# Patient Record
Sex: FEMALE | Race: WHITE | NOT HISPANIC OR LATINO | Employment: OTHER | URBAN - METROPOLITAN AREA
[De-identification: names, ages, dates, MRNs, and addresses within clinical notes are randomized per-mention and may not be internally consistent; named-entity substitution may affect disease eponyms.]

---

## 2023-05-08 DIAGNOSIS — E78.5 HYPERLIPIDEMIA, UNSPECIFIED HYPERLIPIDEMIA TYPE: ICD-10-CM

## 2023-05-08 RX ORDER — ATORVASTATIN CALCIUM 20 MG/1
20 TABLET, FILM COATED ORAL DAILY
COMMUNITY
Start: 2017-04-24 | End: 2023-05-08 | Stop reason: SDUPTHER

## 2023-05-08 RX ORDER — ATORVASTATIN CALCIUM 20 MG/1
20 TABLET, FILM COATED ORAL DAILY
Qty: 90 TABLET | Refills: 3 | Status: SHIPPED | OUTPATIENT
Start: 2023-05-08 | End: 2024-04-10 | Stop reason: SDUPTHER

## 2023-07-19 PROBLEM — H93.8X9 BLOCKED EAR: Status: ACTIVE | Noted: 2023-07-19

## 2023-07-19 PROBLEM — K63.5 HYPERPLASTIC COLON POLYP: Status: ACTIVE | Noted: 2023-07-19

## 2023-07-19 PROBLEM — K76.89 LIVER CYST: Status: ACTIVE | Noted: 2023-07-19

## 2023-07-19 PROBLEM — M79.674 CHRONIC PAIN OF TOE OF RIGHT FOOT: Status: ACTIVE | Noted: 2023-07-19

## 2023-07-19 PROBLEM — E04.2 MULTIPLE THYROID NODULES: Status: ACTIVE | Noted: 2023-07-19

## 2023-07-19 PROBLEM — M20.41 ACQUIRED HAMMERTOE OF RIGHT FOOT: Status: ACTIVE | Noted: 2023-07-19

## 2023-07-19 PROBLEM — N89.8 VAGINAL ITCHING: Status: ACTIVE | Noted: 2023-07-19

## 2023-07-19 PROBLEM — I10 BENIGN ESSENTIAL HYPERTENSION: Status: ACTIVE | Noted: 2023-07-19

## 2023-07-19 PROBLEM — M79.676 TOE PAIN: Status: ACTIVE | Noted: 2023-07-19

## 2023-07-19 PROBLEM — L90.0 LICHEN SCLEROSUS: Status: ACTIVE | Noted: 2023-07-19

## 2023-07-19 PROBLEM — R93.1 ELEVATED CORONARY ARTERY CALCIUM SCORE: Status: ACTIVE | Noted: 2023-07-19

## 2023-07-19 PROBLEM — H61.23 IMPACTED CERUMEN OF BOTH EARS: Status: ACTIVE | Noted: 2023-07-19

## 2023-07-19 PROBLEM — R35.0 URINARY FREQUENCY: Status: ACTIVE | Noted: 2023-07-19

## 2023-07-19 PROBLEM — I65.29 MILD ATHEROSCLEROSIS OF CAROTID ARTERY: Status: ACTIVE | Noted: 2023-07-19

## 2023-07-19 PROBLEM — M85.80 OSTEOPENIA: Status: ACTIVE | Noted: 2023-07-19

## 2023-07-19 PROBLEM — G89.29 CHRONIC PAIN OF TOE OF RIGHT FOOT: Status: ACTIVE | Noted: 2023-07-19

## 2023-07-19 PROBLEM — I25.10 CAD (CORONARY ARTERY DISEASE), NATIVE CORONARY ARTERY: Status: ACTIVE | Noted: 2023-07-19

## 2023-07-19 PROBLEM — F41.9 ANXIETY: Status: ACTIVE | Noted: 2023-07-19

## 2023-07-19 PROBLEM — M25.562 LEFT KNEE PAIN: Status: ACTIVE | Noted: 2023-07-19

## 2023-07-19 PROBLEM — M25.512 LEFT SHOULDER PAIN: Status: ACTIVE | Noted: 2023-07-19

## 2023-07-19 PROBLEM — B37.31 VAGINAL CANDIDIASIS: Status: ACTIVE | Noted: 2023-07-19

## 2023-07-19 PROBLEM — K63.5 BENIGN COLONIC POLYP: Status: ACTIVE | Noted: 2023-07-19

## 2023-07-19 PROBLEM — R42 DIZZINESS: Status: ACTIVE | Noted: 2023-07-19

## 2023-07-19 PROBLEM — R07.89 CHEST DISCOMFORT: Status: ACTIVE | Noted: 2023-07-19

## 2023-07-19 PROBLEM — H61.21 IMPACTED CERUMEN OF RIGHT EAR: Status: ACTIVE | Noted: 2023-07-19

## 2023-07-19 PROBLEM — D12.6 TUBULAR ADENOMA OF COLON: Status: ACTIVE | Noted: 2023-07-19

## 2023-07-19 PROBLEM — L84 PRE-ULCERATIVE CORN OR CALLOUS: Status: ACTIVE | Noted: 2023-07-19

## 2023-07-25 LAB
ALANINE AMINOTRANSFERASE (SGPT) (U/L) IN SER/PLAS: 21 U/L (ref 7–45)
ALBUMIN (G/DL) IN SER/PLAS: 4.3 G/DL (ref 3.4–5)
ALKALINE PHOSPHATASE (U/L) IN SER/PLAS: 56 U/L (ref 33–136)
ANION GAP IN SER/PLAS: 10 MMOL/L (ref 10–20)
ASPARTATE AMINOTRANSFERASE (SGOT) (U/L) IN SER/PLAS: 24 U/L (ref 9–39)
BILIRUBIN TOTAL (MG/DL) IN SER/PLAS: 1.4 MG/DL (ref 0–1.2)
CALCIUM (MG/DL) IN SER/PLAS: 9.8 MG/DL (ref 8.6–10.3)
CARBON DIOXIDE, TOTAL (MMOL/L) IN SER/PLAS: 31 MMOL/L (ref 21–32)
CHLORIDE (MMOL/L) IN SER/PLAS: 103 MMOL/L (ref 98–107)
CHOLESTEROL (MG/DL) IN SER/PLAS: 158 MG/DL (ref 0–199)
CHOLESTEROL IN HDL (MG/DL) IN SER/PLAS: 59.6 MG/DL
CHOLESTEROL/HDL RATIO: 2.7
CREATININE (MG/DL) IN SER/PLAS: 0.67 MG/DL (ref 0.5–1.05)
ERYTHROCYTE DISTRIBUTION WIDTH (RATIO) BY AUTOMATED COUNT: 11.6 % (ref 11.5–14.5)
ERYTHROCYTE MEAN CORPUSCULAR HEMOGLOBIN CONCENTRATION (G/DL) BY AUTOMATED: 31.6 G/DL (ref 32–36)
ERYTHROCYTE MEAN CORPUSCULAR VOLUME (FL) BY AUTOMATED COUNT: 98 FL (ref 80–100)
ERYTHROCYTES (10*6/UL) IN BLOOD BY AUTOMATED COUNT: 4.45 X10E12/L (ref 4–5.2)
GFR FEMALE: >90 ML/MIN/1.73M2
GLUCOSE (MG/DL) IN SER/PLAS: 94 MG/DL (ref 74–99)
HEMATOCRIT (%) IN BLOOD BY AUTOMATED COUNT: 43.7 % (ref 36–46)
HEMOGLOBIN (G/DL) IN BLOOD: 13.8 G/DL (ref 12–16)
LDL: 78 MG/DL (ref 0–99)
LEUKOCYTES (10*3/UL) IN BLOOD BY AUTOMATED COUNT: 6.8 X10E9/L (ref 4.4–11.3)
PLATELETS (10*3/UL) IN BLOOD AUTOMATED COUNT: 265 X10E9/L (ref 150–450)
POTASSIUM (MMOL/L) IN SER/PLAS: 4.1 MMOL/L (ref 3.5–5.3)
PROTEIN TOTAL: 6.9 G/DL (ref 6.4–8.2)
SODIUM (MMOL/L) IN SER/PLAS: 140 MMOL/L (ref 136–145)
THYROTROPIN (MIU/L) IN SER/PLAS BY DETECTION LIMIT <= 0.05 MIU/L: 2.08 MIU/L (ref 0.44–3.98)
TRIGLYCERIDE (MG/DL) IN SER/PLAS: 101 MG/DL (ref 0–149)
UREA NITROGEN (MG/DL) IN SER/PLAS: 14 MG/DL (ref 6–23)
VLDL: 20 MG/DL (ref 0–40)

## 2023-07-26 ENCOUNTER — OFFICE VISIT (OUTPATIENT)
Dept: PRIMARY CARE | Facility: CLINIC | Age: 68
End: 2023-07-26
Payer: MEDICARE

## 2023-07-26 VITALS
HEART RATE: 54 BPM | SYSTOLIC BLOOD PRESSURE: 126 MMHG | HEIGHT: 67 IN | DIASTOLIC BLOOD PRESSURE: 83 MMHG | WEIGHT: 155.2 LBS | OXYGEN SATURATION: 98 % | TEMPERATURE: 97.3 F | BODY MASS INDEX: 24.36 KG/M2

## 2023-07-26 DIAGNOSIS — Z00.00 ROUTINE GENERAL MEDICAL EXAMINATION AT HEALTH CARE FACILITY: ICD-10-CM

## 2023-07-26 DIAGNOSIS — M85.80 OSTEOPENIA, UNSPECIFIED LOCATION: ICD-10-CM

## 2023-07-26 DIAGNOSIS — I25.10 CORONARY ARTERY DISEASE INVOLVING NATIVE CORONARY ARTERY OF NATIVE HEART WITHOUT ANGINA PECTORIS: ICD-10-CM

## 2023-07-26 DIAGNOSIS — D12.6 TUBULAR ADENOMA OF COLON: ICD-10-CM

## 2023-07-26 DIAGNOSIS — Z00.00 MEDICARE ANNUAL WELLNESS VISIT, SUBSEQUENT: Primary | ICD-10-CM

## 2023-07-26 DIAGNOSIS — Z12.31 VISIT FOR SCREENING MAMMOGRAM: ICD-10-CM

## 2023-07-26 DIAGNOSIS — K63.5 HYPERPLASTIC COLONIC POLYP, UNSPECIFIED PART OF COLON: ICD-10-CM

## 2023-07-26 DIAGNOSIS — I10 BENIGN ESSENTIAL HYPERTENSION: ICD-10-CM

## 2023-07-26 DIAGNOSIS — I65.29 MILD ATHEROSCLEROSIS OF CAROTID ARTERY, UNSPECIFIED LATERALITY: ICD-10-CM

## 2023-07-26 DIAGNOSIS — E78.5 HYPERLIPIDEMIA, UNSPECIFIED HYPERLIPIDEMIA TYPE: ICD-10-CM

## 2023-07-26 DIAGNOSIS — E04.2 MULTIPLE THYROID NODULES: ICD-10-CM

## 2023-07-26 PROCEDURE — 99214 OFFICE O/P EST MOD 30 MIN: CPT | Performed by: INTERNAL MEDICINE

## 2023-07-26 PROCEDURE — 1170F FXNL STATUS ASSESSED: CPT | Performed by: INTERNAL MEDICINE

## 2023-07-26 PROCEDURE — 1160F RVW MEDS BY RX/DR IN RCRD: CPT | Performed by: INTERNAL MEDICINE

## 2023-07-26 PROCEDURE — G0439 PPPS, SUBSEQ VISIT: HCPCS | Performed by: INTERNAL MEDICINE

## 2023-07-26 PROCEDURE — 3079F DIAST BP 80-89 MM HG: CPT | Performed by: INTERNAL MEDICINE

## 2023-07-26 PROCEDURE — 3074F SYST BP LT 130 MM HG: CPT | Performed by: INTERNAL MEDICINE

## 2023-07-26 PROCEDURE — 93000 ELECTROCARDIOGRAM COMPLETE: CPT | Performed by: INTERNAL MEDICINE

## 2023-07-26 PROCEDURE — 1036F TOBACCO NON-USER: CPT | Performed by: INTERNAL MEDICINE

## 2023-07-26 PROCEDURE — G0444 DEPRESSION SCREEN ANNUAL: HCPCS | Performed by: INTERNAL MEDICINE

## 2023-07-26 PROCEDURE — 1126F AMNT PAIN NOTED NONE PRSNT: CPT | Performed by: INTERNAL MEDICINE

## 2023-07-26 PROCEDURE — G0442 ANNUAL ALCOHOL SCREEN 15 MIN: HCPCS | Performed by: INTERNAL MEDICINE

## 2023-07-26 PROCEDURE — 1159F MED LIST DOCD IN RCRD: CPT | Performed by: INTERNAL MEDICINE

## 2023-07-26 RX ORDER — ACETAMINOPHEN 500 MG
100 TABLET ORAL DAILY
COMMUNITY
Start: 2022-07-25

## 2023-07-26 RX ORDER — METOPROLOL TARTRATE 25 MG/1
25 TABLET, FILM COATED ORAL 2 TIMES DAILY
COMMUNITY
End: 2024-01-10

## 2023-07-26 RX ORDER — BETAMETHASONE DIPROPIONATE 0.5 MG/G
1 OINTMENT, AUGMENTED TOPICAL
COMMUNITY
End: 2023-12-20 | Stop reason: ALTCHOICE

## 2023-07-26 RX ORDER — ASPIRIN 81 MG/1
81 TABLET ORAL DAILY
COMMUNITY
Start: 2022-07-25

## 2023-07-26 ASSESSMENT — ACTIVITIES OF DAILY LIVING (ADL)
BATHING: INDEPENDENT
DOING_HOUSEWORK: INDEPENDENT
TAKING_MEDICATION: INDEPENDENT
GROCERY_SHOPPING: INDEPENDENT
DRESSING: INDEPENDENT
MANAGING_FINANCES: INDEPENDENT

## 2023-07-26 NOTE — PROGRESS NOTES
"Subjective   Patient ID: Elsa Rojo is a 68 y.o. female who presents for Medicare Annual Wellness Visit Subsequent and Follow-up.     Here for MCR and follow up on HTN,HLD,osteopenia,osteopenia.  Exercising regularly,taking meds,no side effect,no complaints.  She has been under stress due to her ' medical bills issues,denies any depression,no alcohol use.           Review of Systems   Constitutional: Negative.    HENT: Negative.     Eyes: Negative.    Respiratory: Negative.     Cardiovascular: Negative.    Gastrointestinal: Negative.    Genitourinary: Negative.    Neurological: Negative.    Hematological: Negative.    Psychiatric/Behavioral: Negative.         Objective   /83 (BP Location: Right arm, Patient Position: Sitting, BP Cuff Size: Large adult)   Pulse 54   Temp 36.3 °C (97.3 °F) (Temporal)   Ht 1.689 m (5' 6.5\")   Wt 70.4 kg (155 lb 3.2 oz)   SpO2 98%   BMI 24.67 kg/m²     Physical Exam  Constitutional:       Appearance: Normal appearance.   HENT:      Head: Normocephalic and atraumatic.      Right Ear: Tympanic membrane, ear canal and external ear normal.      Left Ear: Tympanic membrane, ear canal and external ear normal.      Mouth/Throat:      Mouth: Mucous membranes are moist.   Eyes:      General: No scleral icterus.     Extraocular Movements: Extraocular movements intact.      Pupils: Pupils are equal, round, and reactive to light.   Cardiovascular:      Rate and Rhythm: Normal rate and regular rhythm.      Pulses: Normal pulses.      Heart sounds: Normal heart sounds. No murmur heard.  Pulmonary:      Effort: Pulmonary effort is normal.      Breath sounds: Normal breath sounds. No wheezing or rhonchi.   Abdominal:      General: Abdomen is flat. Bowel sounds are normal. There is no distension.      Palpations: Abdomen is soft. There is no mass.      Tenderness: There is no abdominal tenderness.      Hernia: No hernia is present.   Musculoskeletal:         General: Normal range of " motion.      Cervical back: Normal range of motion and neck supple.      Right lower leg: No edema.      Left lower leg: No edema.   Skin:     General: Skin is warm.      Comments: Healed site of insect bite lower leg,clean scab.   Neurological:      General: No focal deficit present.      Mental Status: She is alert and oriented to person, place, and time.   Psychiatric:         Mood and Affect: Mood normal.         Behavior: Behavior normal.         Assessment/Plan   Problem List Items Addressed This Visit       Hyperlipidemia     Stable on statin,continue low fat diet.         Relevant Orders    ECG 12 lead (Clinic Performed)    Tubular adenoma of colon    Benign essential hypertension     Stable on current med.  Follow up in 6 months.         Relevant Orders    ECG 12 lead (Clinic Performed)    CAD (coronary artery disease), native coronary artery     Elevated cardiac calcium score.  Denies any CP.         Relevant Medications    metoprolol tartrate (Lopressor) 25 mg tablet    Hyperplastic colon polyp     Colonoscopy up to date.         Mild atherosclerosis of carotid artery    Multiple thyroid nodules    Osteopenia    Visit for screening mammogram - Primary    Relevant Orders    BI mammo bilateral screening tomosynthesis     Other Visit Diagnoses       Routine general medical examination at health care facility

## 2023-07-27 PROBLEM — R07.89 CHEST DISCOMFORT: Status: RESOLVED | Noted: 2023-07-19 | Resolved: 2023-07-27

## 2023-07-27 PROBLEM — Z12.31 VISIT FOR SCREENING MAMMOGRAM: Status: ACTIVE | Noted: 2023-07-26

## 2023-07-27 PROBLEM — R42 DIZZINESS: Status: RESOLVED | Noted: 2023-07-19 | Resolved: 2023-07-27

## 2023-07-27 ASSESSMENT — ENCOUNTER SYMPTOMS
CARDIOVASCULAR NEGATIVE: 1
GASTROINTESTINAL NEGATIVE: 1
EYES NEGATIVE: 1
PSYCHIATRIC NEGATIVE: 1
CONSTITUTIONAL NEGATIVE: 1
HEMATOLOGIC/LYMPHATIC NEGATIVE: 1
NEUROLOGICAL NEGATIVE: 1
RESPIRATORY NEGATIVE: 1

## 2023-10-26 ENCOUNTER — OFFICE VISIT (OUTPATIENT)
Dept: PODIATRY | Facility: CLINIC | Age: 68
End: 2023-10-26
Payer: MEDICARE

## 2023-10-26 DIAGNOSIS — G89.29 CHRONIC PAIN OF TOE OF RIGHT FOOT: ICD-10-CM

## 2023-10-26 DIAGNOSIS — M20.41 ACQUIRED HAMMERTOE OF RIGHT FOOT: Primary | ICD-10-CM

## 2023-10-26 DIAGNOSIS — M79.674 CHRONIC PAIN OF TOE OF RIGHT FOOT: ICD-10-CM

## 2023-10-26 PROCEDURE — 1160F RVW MEDS BY RX/DR IN RCRD: CPT | Performed by: PODIATRIST

## 2023-10-26 PROCEDURE — 1036F TOBACCO NON-USER: CPT | Performed by: PODIATRIST

## 2023-10-26 PROCEDURE — 99212 OFFICE O/P EST SF 10 MIN: CPT | Performed by: PODIATRIST

## 2023-10-26 PROCEDURE — 1159F MED LIST DOCD IN RCRD: CPT | Performed by: PODIATRIST

## 2023-10-26 PROCEDURE — 1126F AMNT PAIN NOTED NONE PRSNT: CPT | Performed by: PODIATRIST

## 2023-10-26 NOTE — PROGRESS NOTES
History Of Present Illness  Elsa Rojo is a 68 y.o. female presenting with chief complaint of: toe pain between the 3rd & 4th toe's     Past Medical History  She has a past medical history of Cellulitis, unspecified (12/04/2013), Other conditions influencing health status, Other conditions influencing health status, Other conditions influencing health status, Other conditions influencing health status (10/29/2013), Other microscopic hematuria, Personal history of colonic polyps, and Personal history of other diseases of the digestive system (10/13/2014).    Surgical History  She has a past surgical history that includes Colonoscopy (12/31/2012); Breast lumpectomy (12/31/2012); Other surgical history (12/31/2012); Other surgical history (12/31/2012); and Other surgical history (01/11/2014).     Social History  She reports that she quit smoking about 17 years ago. Her smoking use included cigarettes. She has never used smokeless tobacco. She reports current alcohol use. She reports that she does not currently use drugs.    Family History  No family history on file.     Allergies  Cephalexin, Bacitracin zinc-polymyxin b, Bupropion, Penicillins, Bacitracin, and Neomycin    Medications  Current Outpatient Medications   Medication Sig Dispense Refill    aspirin 81 mg EC tablet Take 1 tablet (81 mg) by mouth once daily.      atorvastatin (Lipitor) 20 mg tablet Take 1 tablet (20 mg) by mouth once daily. 90 tablet 3    betamethasone, augmented, (Diprolene) 0.05 % ointment Apply 1 Application topically. apply topically a pea sized amount to affected area 2 to 3 times weekly      cholecalciferol (Vitamin D-3) 50 mcg (2,000 unit) capsule Take 1 capsule (50 mcg) by mouth once daily.      metoprolol tartrate (Lopressor) 25 mg tablet Take 1 tablet (25 mg) by mouth 2 times a day.       No current facility-administered medications for this visit.       Review of Systems    REVIEW OF SYSTEMS  GENERAL:  Negative for malaise,  significant weight loss, fever  CARDIOVASCULAR: leg swelling   MUSCULOSKELETAL:  Negative for joint pain or swelling, back pain, and muscle pain.  SKIN:  Negative for lesions, rash, and itching  PSYCH:  Negative for sleep disturbance, mood disorder and recent psychosocial stressors  NEURO: Negative, denies any burning, tingling or numbness     Objective:   Vasc: DP and PT pulses are palpable bilateral.  CFT is less than 3 seconds bilateral.  Skin temperature is warm to cool proximal to distal bilateral.      Neuro:  Light touch is intact to the foot bilateral.  Protective sensation is intact to the foot when tested with the 5.07 SWM bilateral.  There is no clonus noted.  The hallux is downgoing bilateral.      Derm: Nails are normal. Skin is supple with normal texture and turgor noted.  Webspaces are clean, dry and intact bilateral.  There are no hyperkeratoses, ulcerations, verruca or other lesions noted.      Ortho: Muscle strength is 5/5 for all pedal groups tested.  Ankle joint, subtalar joint, 1st MPJ and lesser MPJ ROM is full and without pain or crepitus.  The foot type is rectus bilateral off weight bearing.  There are no structural deformities noted. Hammertoe 3,4 without pressure necrosis     Assessment/Plan     Diagnoses and all orders for this visit:  Acquired hammertoe of right foot  Chronic pain of toe of right foot      Since pt is symptom free when she wears toe separator, cont this practice. Avoid surgery.   Neida Martin-Daniel, DPLI  [address] [place] [address]

## 2023-11-09 ENCOUNTER — TELEPHONE (OUTPATIENT)
Dept: PRIMARY CARE | Facility: CLINIC | Age: 68
End: 2023-11-09
Payer: MEDICARE

## 2023-11-09 NOTE — TELEPHONE ENCOUNTER
Pt of Dr Esquivel's. Has been dealing with some personal things with husbands recent cancer diagnosis and her L elbow pain. Requesting an appointment with her only. He is getting ready for a lot of appointments with his treatments currently. Please advise when she may schedule. Thank you!

## 2023-11-09 NOTE — TELEPHONE ENCOUNTER
Pt cannot unfortunately make an appt tomorrow. She is asking for another day/time please. She pointed out that they are getting ready for a lot of things to begin with her husbands cancer treatment so appointments will be picking up soon and she wants to be there for him. Please advise. Thank you!

## 2023-11-15 ENCOUNTER — TELEPHONE (OUTPATIENT)
Dept: OBSTETRICS AND GYNECOLOGY | Facility: CLINIC | Age: 68
End: 2023-11-15
Payer: MEDICARE

## 2023-11-15 NOTE — TELEPHONE ENCOUNTER
Pt requests refills of her betamethasone dipropionate steroid cream be sent to her local pharmacy. Please advise?

## 2023-11-17 ENCOUNTER — APPOINTMENT (OUTPATIENT)
Dept: OBSTETRICS AND GYNECOLOGY | Facility: CLINIC | Age: 68
End: 2023-11-17
Payer: MEDICARE

## 2023-11-17 ENCOUNTER — OFFICE VISIT (OUTPATIENT)
Dept: PRIMARY CARE | Facility: CLINIC | Age: 68
End: 2023-11-17
Payer: MEDICARE

## 2023-11-17 VITALS
TEMPERATURE: 98.1 F | BODY MASS INDEX: 24.71 KG/M2 | SYSTOLIC BLOOD PRESSURE: 125 MMHG | HEIGHT: 67 IN | HEART RATE: 57 BPM | OXYGEN SATURATION: 97 % | DIASTOLIC BLOOD PRESSURE: 77 MMHG | WEIGHT: 157.4 LBS

## 2023-11-17 DIAGNOSIS — F33.9 DEPRESSION, RECURRENT (CMS-HCC): ICD-10-CM

## 2023-11-17 DIAGNOSIS — G47.09 OTHER INSOMNIA: ICD-10-CM

## 2023-11-17 DIAGNOSIS — M25.522 LEFT ELBOW PAIN: Primary | ICD-10-CM

## 2023-11-17 DIAGNOSIS — I10 BENIGN ESSENTIAL HYPERTENSION: ICD-10-CM

## 2023-11-17 DIAGNOSIS — M25.551 ACUTE PAIN OF RIGHT HIP: ICD-10-CM

## 2023-11-17 PROCEDURE — 1160F RVW MEDS BY RX/DR IN RCRD: CPT | Performed by: INTERNAL MEDICINE

## 2023-11-17 PROCEDURE — 1036F TOBACCO NON-USER: CPT | Performed by: INTERNAL MEDICINE

## 2023-11-17 PROCEDURE — 1126F AMNT PAIN NOTED NONE PRSNT: CPT | Performed by: INTERNAL MEDICINE

## 2023-11-17 PROCEDURE — 3078F DIAST BP <80 MM HG: CPT | Performed by: INTERNAL MEDICINE

## 2023-11-17 PROCEDURE — 1159F MED LIST DOCD IN RCRD: CPT | Performed by: INTERNAL MEDICINE

## 2023-11-17 PROCEDURE — 99214 OFFICE O/P EST MOD 30 MIN: CPT | Performed by: INTERNAL MEDICINE

## 2023-11-17 PROCEDURE — 3074F SYST BP LT 130 MM HG: CPT | Performed by: INTERNAL MEDICINE

## 2023-11-17 RX ORDER — TRAZODONE HYDROCHLORIDE 50 MG/1
50 TABLET ORAL NIGHTLY PRN
Qty: 30 TABLET | Refills: 0 | Status: SHIPPED | OUTPATIENT
Start: 2023-11-17 | End: 2023-11-29 | Stop reason: SDUPTHER

## 2023-11-17 ASSESSMENT — PATIENT HEALTH QUESTIONNAIRE - PHQ9
10. IF YOU CHECKED OFF ANY PROBLEMS, HOW DIFFICULT HAVE THESE PROBLEMS MADE IT FOR YOU TO DO YOUR WORK, TAKE CARE OF THINGS AT HOME, OR GET ALONG WITH OTHER PEOPLE: NOT DIFFICULT AT ALL
5. POOR APPETITE OR OVEREATING: NEARLY EVERY DAY
6. FEELING BAD ABOUT YOURSELF - OR THAT YOU ARE A FAILURE OR HAVE LET YOURSELF OR YOUR FAMILY DOWN: NOT AT ALL
2. FEELING DOWN, DEPRESSED OR HOPELESS: SEVERAL DAYS
SUM OF ALL RESPONSES TO PHQ9 QUESTIONS 1 AND 2: 3
1. LITTLE INTEREST OR PLEASURE IN DOING THINGS: MORE THAN HALF THE DAYS
8. MOVING OR SPEAKING SO SLOWLY THAT OTHER PEOPLE COULD HAVE NOTICED. OR THE OPPOSITE, BEING SO FIGETY OR RESTLESS THAT YOU HAVE BEEN MOVING AROUND A LOT MORE THAN USUAL: NOT AT ALL
7. TROUBLE CONCENTRATING ON THINGS, SUCH AS READING THE NEWSPAPER OR WATCHING TELEVISION: SEVERAL DAYS
SUM OF ALL RESPONSES TO PHQ QUESTIONS 1-9: 13
3. TROUBLE FALLING OR STAYING ASLEEP OR SLEEPING TOO MUCH: NEARLY EVERY DAY
9. THOUGHTS THAT YOU WOULD BE BETTER OFF DEAD, OR OF HURTING YOURSELF: NOT AT ALL
4. FEELING TIRED OR HAVING LITTLE ENERGY: NEARLY EVERY DAY

## 2023-11-17 NOTE — PROGRESS NOTES
"Subjective   Patient ID: Elsa Rojo is a 68 y.o. female who presents for Sore left elbow , Sore right hip during walking , and Discuss personal issues .    Pt c/o left elbow pain for a year,triggered by moving her left elbow, she denies any history of trauma or fall.  She also noted recent history of pain right hip with walking at times.  She also want to discuss her insomnia, her  was recently diagnosed with metastatic esophageal cancer, patient has been stressed out about it, she is planning on talking to a counselor, she also has good support from her friend and from her Gnostic[employer] member.  She has been taking 5 mg of melatonin.  She is planning off restarting exercising at the [facility] while her  is seeing his oncologist for his cancer treatment.  For a while she had no motivation but lately decided to change her attitude and be more proactive to regarding her health and regular exercise.         Review of Systems   Musculoskeletal:         As HPI.   Psychiatric/Behavioral:  Positive for sleep disturbance.         As HPI.       Objective   /77 (BP Location: Right arm, Patient Position: Sitting, BP Cuff Size: Adult)   Pulse 57   Temp 36.7 °C (98.1 °F) (Temporal)   Ht 1.689 m (5' 6.5\")   Wt 71.4 kg (157 lb 6.4 oz)   SpO2 97%   BMI 25.02 kg/m²     Physical Exam  Constitutional:       Appearance: Normal appearance.   HENT:      Head: Normocephalic and atraumatic.   Eyes:      Extraocular Movements: Extraocular movements intact.      Pupils: Pupils are equal, round, and reactive to light.   Cardiovascular:      Rate and Rhythm: Normal rate and regular rhythm.      Heart sounds: Normal heart sounds.   Pulmonary:      Effort: Pulmonary effort is normal.      Breath sounds: Normal breath sounds. No wheezing or rhonchi.   Abdominal:      General: Abdomen is flat. Bowel sounds are normal. There is no distension.      Palpations: Abdomen is soft.   Musculoskeletal:      Cervical back: Normal range " of motion and neck supple.      Right lower leg: No edema.      Left lower leg: No edema.      Comments: Left elbow: No effusion no deformities, there is pain over lateral epicondyle area with certain movement of her elbow.  Right hip no palpable pain full range of motion.   Skin:     General: Skin is warm.   Neurological:      General: No focal deficit present.      Mental Status: She is alert and oriented to person, place, and time.   Psychiatric:         Mood and Affect: Mood normal.         Behavior: Behavior normal.         Assessment/Plan   Problem List Items Addressed This Visit             ICD-10-CM    Benign essential hypertension I10     Stable on current medication.         Depression, recurrent (CMS/[facility]) F33.9     Reactive depression due to her  recent diagnosis of cancer.  Emotional support provided to patient.  Patient to see a counselor and she will let me know if she should be started on any medication.  We will start trazodone for insomnia.  She can continue melatonin 5 mg at bedtime as needed.         Left elbow pain - Primary M25.522     Suspect tendinitis.  Will check x-ray and refer to orthopedic.         Relevant Orders    XR elbow left 3+ views    Referral to Orthopaedic Surgery    Other insomnia G47.09    Relevant Medications    traZODone (Desyrel) 50 mg tablet    Acute pain of right hip M25.551     Can take Tylenol as needed.

## 2023-11-18 PROBLEM — M25.551 ACUTE PAIN OF RIGHT HIP: Status: ACTIVE | Noted: 2023-11-18

## 2023-11-18 ASSESSMENT — ENCOUNTER SYMPTOMS: SLEEP DISTURBANCE: 1

## 2023-11-19 NOTE — ASSESSMENT & PLAN NOTE
Reactive depression due to her  recent diagnosis of cancer.  Emotional support provided to patient.  Patient to see a counselor and she will let me know if she should be started on any medication.  We will start trazodone for insomnia.  She can continue melatonin 5 mg at bedtime as needed.

## 2023-11-21 ENCOUNTER — ANCILLARY PROCEDURE (OUTPATIENT)
Dept: RADIOLOGY | Facility: CLINIC | Age: 68
End: 2023-11-21
Payer: MEDICARE

## 2023-11-21 DIAGNOSIS — M25.522 LEFT ELBOW PAIN: ICD-10-CM

## 2023-11-21 PROCEDURE — 73080 X-RAY EXAM OF ELBOW: CPT | Mod: LEFT SIDE | Performed by: RADIOLOGY

## 2023-11-21 PROCEDURE — 73080 X-RAY EXAM OF ELBOW: CPT | Mod: LT

## 2023-11-22 NOTE — RESULT ENCOUNTER NOTE
Left elbow XRAY is normal,no arthritis,there is some  few tiny non specific calcification over the left ulna,please see the orthopedic as advised at recent visit.

## 2023-11-27 NOTE — PROGRESS NOTES
CHIEF COMPLAINT: No chief complaint on file.      History: 68 y.o. female presents to the office today for ***     Past medical history, past surgical history, medications, allergies, family history, social history, and review of systems were reviewed today.    A 12 point review of systems was negative other than as stated in the HPI.    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    Other microscopic hematuria     Microscopic hematuria    Personal history of colonic polyps     History of adenomatous polyp of colon    Personal history of other diseases of the digestive system 10/13/2014    History of hemorrhoids        Allergies   Allergen Reactions    Cephalexin Hives and Rash    Bacitracin Zinc-Polymyxin B Unknown    Bupropion Hives    Penicillins Hives    Bacitracin Rash     Positive patch test    Neomycin Rash     Positive patch test        Past Surgical History:   Procedure Laterality Date    BREAST LUMPECTOMY  12/31/2012    Breast Surgery Lumpectomy    COLONOSCOPY  12/31/2012    Complete Colonoscopy    OTHER SURGICAL HISTORY  12/31/2012    Radiation Therapy    OTHER SURGICAL HISTORY  12/31/2012    Stress Test ECG Performed    OTHER SURGICAL HISTORY  01/11/2014    Pap smear for cervical cancer screening        No family history on file.     Social History     Socioeconomic History    Marital status:      Spouse name: Not on file    Number of children: Not on file    Years of education: Not on file    Highest education level: Not on file   Occupational History    Not on file   Tobacco Use    Smoking status: Former     Types: Cigarettes     Quit date: 2006      "Years since quittin.9    Smokeless tobacco: Never   Vaping Use    Vaping Use: Never used   Substance and Sexual Activity    Alcohol use: Yes     Comment: VERY SOCIAL    Drug use: Not Currently    Sexual activity: Not on file   Other Topics Concern    Not on file   Social History Narrative    Not on file     Social Determinants of Health     Financial Resource Strain: Not on file   Food Insecurity: Not on file   Transportation Needs: Not on file   Physical Activity: Not on file   Stress: Not on file   Social Connections: Not on file   Intimate Partner Violence: Not on file   Housing Stability: Not on file        CURRENT MEDICATIONS:   Current Outpatient Medications   Medication Sig Dispense Refill    aspirin 81 mg EC tablet Take 1 tablet (81 mg) by mouth once daily.      atorvastatin (Lipitor) 20 mg tablet Take 1 tablet (20 mg) by mouth once daily. 90 tablet 3    betamethasone, augmented, (Diprolene) 0.05 % ointment Apply 1 Application topically. apply topically a pea sized amount to affected area 2 to 3 times weekly      cholecalciferol (Vitamin D-3) 50 mcg (2,000 unit) capsule Take 1 capsule (50 mcg) by mouth once daily.      metoprolol tartrate (Lopressor) 25 mg tablet Take 1 tablet (25 mg) by mouth 2 times a day.      traZODone (Desyrel) 50 mg tablet Take 1 tablet (50 mg) by mouth as needed at bedtime for sleep. 30 tablet 0     No current facility-administered medications for this visit.       Physical Examination:      2022     8:07 AM 2022     9:34 AM 2022     2:21 PM 2022     8:52 AM 2023     9:38 AM 2023     8:44 AM 2023     3:43 PM   Vitals   Systolic 129 142 110 124 127 126 125   Diastolic 77 70 66 84 81 83 77   Heart Rate 55 64 60  55 54 57   Temp 36.1 °C (97 °F)    36.7 °C (98.1 °F) 36.3 °C (97.3 °F) 36.7 °C (98.1 °F)   Resp 14         Height (in)  1.689 m (5' 6.5\") 1.689 m (5' 6.5\") 1.689 m (5' 6.5\") 1.689 m (5' 6.5\") 1.689 m (5' 6.5\") 1.689 m (5' 6.5\")   Weight " (lb) 152 152 155 155.38 153.13 155.2 157.4   BMI 24.46 kg/m2 24.17 kg/m2 24.64 kg/m2 24.7 kg/m2 24.34 kg/m2 24.67 kg/m2 25.02 kg/m2   BSA (m2) 1.79 m2 1.8 m2 1.82 m2 1.82 m2 1.81 m2 1.82 m2 1.83 m2   Visit Report      Report Report      There is no height or weight on file to calculate BMI.    Well-appearing, appears stated age, pleasant and cooperative, appropriate mood and behavior. Height and weight reviewed. Alert and oriented x3.  Auditory function intact.  No acute distress.  Intact ocular function, MICHAEL, EOMI. Breathing is unlabored .  There is no evidence of jugular venous distension. Skin appearance is normal without evidence of rash or other lesions. 2+ radial pulses bilaterally, fingers pink and wwp, good capillary refill, no pitting edema. No appreciable lymphadenopathy in bilateral upper extremities. SILT throughout both upper extremities, median/radial/ulnar/musculocutaneous/axillary nerve motor and sensory intact (except for abnormalities noted in focused musculoskeletal exam section below).     Neck exam: ***Full range of motion of the neck in flexion/extension and rotational movements. No significant areas of tenderness to palpation in the neck.    On exam of bilateral upper extremities, ***    Imaging: ***     Assessment: ***    Plan: ***    Dragon software was used to dictate this note, please be aware that minor errors in transcription may be present.    Juan Dominguez MD    Shoulder/Elbow Surgery  [facility]

## 2023-11-28 ENCOUNTER — APPOINTMENT (OUTPATIENT)
Dept: ORTHOPEDIC SURGERY | Facility: CLINIC | Age: 68
End: 2023-11-28
Payer: MEDICARE

## 2023-11-29 DIAGNOSIS — G47.09 OTHER INSOMNIA: ICD-10-CM

## 2023-11-29 RX ORDER — TRAZODONE HYDROCHLORIDE 50 MG/1
50 TABLET ORAL NIGHTLY PRN
Qty: 90 TABLET | Refills: 3 | Status: SHIPPED | OUTPATIENT
Start: 2023-11-29 | End: 2024-02-19 | Stop reason: SDUPTHER

## 2023-11-29 NOTE — TELEPHONE ENCOUNTER
Patient was seen 11/17/2023 and said that you put her on Trazodone to try out and she is doing really well on this medication. Patient is now requesting a 90 day supply sent to [facility]. I sent rx request to you. Please advise once filled.

## 2023-12-11 NOTE — PROGRESS NOTES
CHIEF COMPLAINT: No chief complaint on file.      History: 68 y.o. female presents to the office today for ***     Past medical history, past surgical history, medications, allergies, family history, social history, and review of systems were reviewed today.    A 12 point review of systems was negative other than as stated in the HPI.    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    Other microscopic hematuria     Microscopic hematuria    Personal history of colonic polyps     History of adenomatous polyp of colon    Personal history of other diseases of the digestive system 10/13/2014    History of hemorrhoids        Allergies   Allergen Reactions    Cephalexin Hives and Rash    Bacitracin Zinc-Polymyxin B Unknown    Bupropion Hives    Penicillins Hives    Bacitracin Rash     Positive patch test    Neomycin Rash     Positive patch test        Past Surgical History:   Procedure Laterality Date    BREAST LUMPECTOMY  12/31/2012    Breast Surgery Lumpectomy    COLONOSCOPY  12/31/2012    Complete Colonoscopy    OTHER SURGICAL HISTORY  12/31/2012    Radiation Therapy    OTHER SURGICAL HISTORY  12/31/2012    Stress Test ECG Performed    OTHER SURGICAL HISTORY  01/11/2014    Pap smear for cervical cancer screening        No family history on file.     Social History     Socioeconomic History    Marital status:      Spouse name: Not on file    Number of children: Not on file    Years of education: Not on file    Highest education level: Not on file   Occupational History    Not on file   Tobacco Use    Smoking status: Former     Types: Cigarettes     Quit date: 2006      "Years since quittin.9    Smokeless tobacco: Never   Vaping Use    Vaping Use: Never used   Substance and Sexual Activity    Alcohol use: Yes     Comment: VERY SOCIAL    Drug use: Not Currently    Sexual activity: Not on file   Other Topics Concern    Not on file   Social History Narrative    Not on file     Social Determinants of Health     Financial Resource Strain: Not on file   Food Insecurity: Not on file   Transportation Needs: Not on file   Physical Activity: Not on file   Stress: Not on file   Social Connections: Not on file   Intimate Partner Violence: Not on file   Housing Stability: Not on file        CURRENT MEDICATIONS:   Current Outpatient Medications   Medication Sig Dispense Refill    aspirin 81 mg EC tablet Take 1 tablet (81 mg) by mouth once daily.      atorvastatin (Lipitor) 20 mg tablet Take 1 tablet (20 mg) by mouth once daily. 90 tablet 3    betamethasone, augmented, (Diprolene) 0.05 % ointment Apply 1 Application topically. apply topically a pea sized amount to affected area 2 to 3 times weekly      cholecalciferol (Vitamin D-3) 50 mcg (2,000 unit) capsule Take 1 capsule (50 mcg) by mouth once daily.      metoprolol tartrate (Lopressor) 25 mg tablet Take 1 tablet (25 mg) by mouth 2 times a day.      traZODone (Desyrel) 50 mg tablet Take 1 tablet (50 mg) by mouth as needed at bedtime for sleep. 90 tablet 3     No current facility-administered medications for this visit.       Physical Examination:      2022     8:07 AM 2022     9:34 AM 2022     2:21 PM 2022     8:52 AM 2023     9:38 AM 2023     8:44 AM 2023     3:43 PM   Vitals   Systolic 129 142 110 124 127 126 125   Diastolic 77 70 66 84 81 83 77   Heart Rate 55 64 60  55 54 57   Temp 36.1 °C (97 °F)    36.7 °C (98.1 °F) 36.3 °C (97.3 °F) 36.7 °C (98.1 °F)   Resp 14         Height (in)  1.689 m (5' 6.5\") 1.689 m (5' 6.5\") 1.689 m (5' 6.5\") 1.689 m (5' 6.5\") 1.689 m (5' 6.5\") 1.689 m (5' 6.5\")   Weight " (lb) 152 152 155 155.38 153.13 155.2 157.4   BMI 24.46 kg/m2 24.17 kg/m2 24.64 kg/m2 24.7 kg/m2 24.34 kg/m2 24.67 kg/m2 25.02 kg/m2   BSA (m2) 1.79 m2 1.8 m2 1.82 m2 1.82 m2 1.81 m2 1.82 m2 1.83 m2   Visit Report      Report Report      There is no height or weight on file to calculate BMI.    Well-appearing, appears stated age, pleasant and cooperative, appropriate mood and behavior. Height and weight reviewed. Alert and oriented x3.  Auditory function intact.  No acute distress.  Intact ocular function, MICHAEL, EOMI. Breathing is unlabored .  There is no evidence of jugular venous distension. Skin appearance is normal without evidence of rash or other lesions. 2+ radial pulses bilaterally, fingers pink and wwp, good capillary refill, no pitting edema. No appreciable lymphadenopathy in bilateral upper extremities. SILT throughout both upper extremities, median/radial/ulnar/musculocutaneous/axillary nerve motor and sensory intact (except for abnormalities noted in focused musculoskeletal exam section below).     Neck exam: ***Full range of motion of the neck in flexion/extension and rotational movements. No significant areas of tenderness to palpation in the neck.    On exam of bilateral upper extremities, ***    Imaging: ***     Assessment: ***    Plan: ***    Dragon software was used to dictate this note, please be aware that minor errors in transcription may be present.    Juan Dominguez MD    Shoulder/Elbow Surgery  [facility]

## 2023-12-12 ENCOUNTER — APPOINTMENT (OUTPATIENT)
Dept: ORTHOPEDIC SURGERY | Facility: CLINIC | Age: 68
End: 2023-12-12
Payer: MEDICARE

## 2023-12-14 ENCOUNTER — ANCILLARY PROCEDURE (OUTPATIENT)
Dept: RADIOLOGY | Facility: CLINIC | Age: 68
End: 2023-12-14
Payer: MEDICARE

## 2023-12-14 DIAGNOSIS — Z12.31 ENCOUNTER FOR SCREENING MAMMOGRAM FOR MALIGNANT NEOPLASM OF BREAST: ICD-10-CM

## 2023-12-14 PROCEDURE — 77067 SCR MAMMO BI INCL CAD: CPT

## 2023-12-14 PROCEDURE — 77067 SCR MAMMO BI INCL CAD: CPT | Mod: BILATERAL PROCEDURE | Performed by: RADIOLOGY

## 2023-12-14 PROCEDURE — 77063 BREAST TOMOSYNTHESIS BI: CPT | Mod: BILATERAL PROCEDURE | Performed by: RADIOLOGY

## 2023-12-20 ENCOUNTER — OFFICE VISIT (OUTPATIENT)
Dept: OBSTETRICS AND GYNECOLOGY | Facility: CLINIC | Age: 68
End: 2023-12-20
Payer: MEDICARE

## 2023-12-20 VITALS
WEIGHT: 158 LBS | HEIGHT: 67 IN | BODY MASS INDEX: 24.8 KG/M2 | DIASTOLIC BLOOD PRESSURE: 74 MMHG | SYSTOLIC BLOOD PRESSURE: 118 MMHG

## 2023-12-20 DIAGNOSIS — L90.0 LICHEN SCLEROSUS: ICD-10-CM

## 2023-12-20 DIAGNOSIS — N90.4 LICHEN SCLEROSUS OF VULVA: Primary | ICD-10-CM

## 2023-12-20 PROCEDURE — 1126F AMNT PAIN NOTED NONE PRSNT: CPT | Performed by: OBSTETRICS & GYNECOLOGY

## 2023-12-20 PROCEDURE — 1160F RVW MEDS BY RX/DR IN RCRD: CPT | Performed by: OBSTETRICS & GYNECOLOGY

## 2023-12-20 PROCEDURE — 1036F TOBACCO NON-USER: CPT | Performed by: OBSTETRICS & GYNECOLOGY

## 2023-12-20 PROCEDURE — 3074F SYST BP LT 130 MM HG: CPT | Performed by: OBSTETRICS & GYNECOLOGY

## 2023-12-20 PROCEDURE — 3078F DIAST BP <80 MM HG: CPT | Performed by: OBSTETRICS & GYNECOLOGY

## 2023-12-20 PROCEDURE — 1159F MED LIST DOCD IN RCRD: CPT | Performed by: OBSTETRICS & GYNECOLOGY

## 2023-12-20 PROCEDURE — 99203 OFFICE O/P NEW LOW 30 MIN: CPT | Performed by: OBSTETRICS & GYNECOLOGY

## 2023-12-20 RX ORDER — BETAMETHASONE DIPROPIONATE 0.5 MG/G
CREAM TOPICAL 2 TIMES WEEKLY
Qty: 15 G | Refills: 3 | Status: CANCELLED | OUTPATIENT
Start: 2023-12-21

## 2023-12-20 RX ORDER — BETAMETHASONE DIPROPIONATE 0.5 MG/G
OINTMENT, AUGMENTED TOPICAL 2 TIMES DAILY
Qty: 15 G | Refills: 0 | Status: SHIPPED | OUTPATIENT
Start: 2023-12-20

## 2023-12-20 RX ORDER — BETAMETHASONE DIPROPIONATE 0.5 MG/G
CREAM TOPICAL 2 TIMES WEEKLY
COMMUNITY
End: 2023-12-20 | Stop reason: ALTCHOICE

## 2023-12-20 NOTE — PROGRESS NOTES
Subjective   Patient ID: Elsa Rojo is a 68 y.o. female who presents for Annual Exam (LS check up).  Needs a new gyn.   Lichens, needs to be checked annually.  Needs to be checked.  Lichen three years ago. Flares up with high stress.  Now, it's kind of settled down.   is very sick. Waiting to hear from tumor board if more cancer.   Using betamethasone as needed, and trying not to scratch.   Exercise- yes, gym, walk.         Review of Systems    Objective   Physical Exam  Constitutional:       Appearance: Normal appearance. She is normal weight.   HENT:      Head: Normocephalic.   Neck:      Thyroid: No thyroid mass or thyromegaly.   Pulmonary:      Effort: Pulmonary effort is normal.   Abdominal:      Palpations: Abdomen is soft.   Genitourinary:     General: Normal vulva.      Exam position: Lithotomy position.      Cervix: Normal.      Uterus: Normal.       Adnexa: Right adnexa normal and left adnexa normal.          Comments: Mild erythema  Musculoskeletal:         General: Normal range of motion.      Cervical back: Normal range of motion and neck supple.   Skin:     General: Skin is warm and dry.   Neurological:      General: No focal deficit present.      Mental Status: She is alert.   Psychiatric:         Mood and Affect: Mood normal.         Behavior: Behavior normal.         Thought Content: Thought content normal.         Judgment: Judgment normal.         Assessment/Plan   Problem List Items Addressed This Visit             ICD-10-CM       Genitourinary and Reproductive    Lichen sclerosus of vulva - Primary N90.4     Bethamethasone ointment.  Use weekly for maintenance, then as needed.            Skin    Lichen sclerosus L90.0    Relevant Medications    betamethasone, augmented, (Diprolene, augmented,) 0.05 % ointment            Ruby Alcocer MD 12/20/23 9:58 PM

## 2023-12-21 ENCOUNTER — APPOINTMENT (OUTPATIENT)
Dept: OBSTETRICS AND GYNECOLOGY | Facility: CLINIC | Age: 68
End: 2023-12-21
Payer: MEDICARE

## 2023-12-29 NOTE — PROGRESS NOTES
CHIEF COMPLAINT:   Chief Complaint   Patient presents with    Left Elbow - Pain     History: 68 y.o. female presents to the office today for evaluation of her left elbow.  Patient is right-hand dominant.  She is retired.  She still enjoys walking quite a bit.  She is caring for her  who has a terminal illness.  She says that she is been dealing with pain in her left elbow for about the last year.  No specific injury she can remember.  Pain is primarily laterally based.  Difficulty with certain maneuvers of the left arm especially with full extension and picking up and twisting.  Does feel that she is able to do all of her desired activities, but the pain is still persistent.  Denies any numbness or tingling.  Has not had any specific treatments yet.    Past medical history, past surgical history, medications, allergies, family history, social history, and review of systems were reviewed today.    A 12 point review of systems was negative other than as stated in the HPI.    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    Other microscopic hematuria     Microscopic hematuria    Personal history of colonic polyps     History of adenomatous polyp of colon    Personal history of other diseases of the digestive system 10/13/2014    History of hemorrhoids        Allergies   Allergen Reactions    Cephalexin Hives and Rash    Bacitracin Zinc-Polymyxin B Unknown    Bupropion Hives    Penicillins Hives    Bacitracin Rash     Positive patch test    Neomycin Rash     Positive patch test        Past Surgical History:   Procedure Laterality Date    BREAST LUMPECTOMY Left 01/01/2006     2006 Left breast lumpectomy, XRT    COLONOSCOPY  2012    Complete Colonoscopy    LYMPHADENECTOMY      breast cancer    OTHER SURGICAL HISTORY  2012    Radiation Therapy    OTHER SURGICAL HISTORY  2012    Stress Test ECG Performed    OTHER SURGICAL HISTORY  2014    Pap smear for cervical cancer screening        Family History   Problem Relation Name Age of Onset    Motor neuron disease Mother      Lung cancer Father      Lymphoma Sister          Social History     Socioeconomic History    Marital status:      Spouse name: Not on file    Number of children: Not on file    Years of education: Not on file    Highest education level: Not on file   Occupational History    Occupation: retired   Tobacco Use    Smoking status: Former     Types: Cigarettes     Quit date:      Years since quittin.0    Smokeless tobacco: Never   Vaping Use    Vaping Use: Never used   Substance and Sexual Activity    Alcohol use: Yes     Comment: VERY SOCIAL    Drug use: Not Currently    Sexual activity: Not on file   Other Topics Concern    Not on file   Social History Narrative    Not on file     Social Determinants of Health     Financial Resource Strain: Not on file   Food Insecurity: Not on file   Transportation Needs: Not on file   Physical Activity: Not on file   Stress: Not on file   Social Connections: Not on file   Intimate Partner Violence: Not on file   Housing Stability: Not on file        CURRENT MEDICATIONS:   Current Outpatient Medications   Medication Sig Dispense Refill    aspirin 81 mg EC tablet Take 1 tablet (81 mg) by mouth once daily.      atorvastatin (Lipitor) 20 mg tablet Take 1 tablet (20 mg) by mouth once daily. 90 tablet 3    betamethasone, augmented, (Diprolene, augmented,) 0.05 % ointment Apply topically 2 times a day. 15 g 0    cholecalciferol (Vitamin D-3) 50 mcg (2,000 unit) capsule Take 1 capsule (50 mcg) by mouth once daily.      metoprolol tartrate (Lopressor) 25 mg  "tablet Take 1 tablet (25 mg) by mouth 2 times a day.      traZODone (Desyrel) 50 mg tablet Take 1 tablet (50 mg) by mouth as needed at bedtime for sleep. 90 tablet 3     No current facility-administered medications for this visit.       Physical Examination:      7/25/2022     9:34 AM 8/22/2022     2:21 PM 11/14/2022     8:52 AM 1/11/2023     9:38 AM 7/26/2023     8:44 AM 11/17/2023     3:43 PM 12/20/2023    10:48 AM   Vitals   Systolic 142 110 124 127 126 125 118   Diastolic 70 66 84 81 83 77 74   Heart Rate 64 60  55 54 57    Temp    36.7 °C (98.1 °F) 36.3 °C (97.3 °F) 36.7 °C (98.1 °F)    Height (in) 1.689 m (5' 6.5\") 1.689 m (5' 6.5\") 1.689 m (5' 6.5\") 1.689 m (5' 6.5\") 1.689 m (5' 6.5\") 1.689 m (5' 6.5\") 1.689 m (5' 6.5\")   Weight (lb) 152 155 155.38 153.13 155.2 157.4 158   BMI 24.17 kg/m2 24.64 kg/m2 24.7 kg/m2 24.34 kg/m2 24.67 kg/m2 25.02 kg/m2 25.12 kg/m2   BSA (m2) 1.8 m2 1.82 m2 1.82 m2 1.81 m2 1.82 m2 1.83 m2 1.83 m2   Visit Report     Report Report Report      There is no height or weight on file to calculate BMI.    Well-appearing, appears stated age, pleasant and cooperative, appropriate mood and behavior. Height and weight reviewed. Alert and oriented x3.  Auditory function intact.  No acute distress.  Intact ocular function, MICHAEL, EOMI. Breathing is unlabored .  There is no evidence of jugular venous distension. Skin appearance is normal without evidence of rash or other lesions. 2+ radial pulses bilaterally, fingers pink and wwp, good capillary refill, no pitting edema. No appreciable lymphadenopathy in bilateral upper extremities. SILT throughout both upper extremities, median/radial/ulnar/musculocutaneous/axillary nerve motor and sensory intact (except for abnormalities noted in focused musculoskeletal exam section below).     Neck exam: Full range of motion of the neck in flexion/extension and rotational movements. No significant areas of tenderness to palpation in the neck.    On exam of " bilateral upper extremities, tenderness right over the left lateral epicondyle.  No sensitivity medially or posteriorly.  Negative Tinel's at the cubital tunnel.  Full range of motion and strength of both elbows.  Arc of motion from 0-1 50.  Full pronation supination.  Pain with resisted wrist extension on the left side.  No pain with wrist flexion.    Imaging: Radiographs of left elbow reviewed today.  Personally interpreted by myself.  Preserved joint spaces.  Some calcifications ulnarly.    Assessment: Left lateral epicondylitis    Plan: Long discussion with the patient with treatment options diagnosis.  I do think that she has tennis elbow left elbow.  I did discuss with her that the calcification seen on radiographs do not clinically correlate for where she is having pain and I do not think that these are a big issue.  From my standpoint, we discussed conservative options for tennis elbow.  This included bracing, physician directed exercises, activity modifications, and over-the-counter medications.  I did teach her some activity modifications that she can try on her own.  I did state that we are not as aggressive with cortisone injections in this area anymore, but if her pain persist to the point that it is causing severe limitation in her daily life, then would consider at that point.  All questions were answered.  Follow-up as needed.    Dragon software was used to dictate this note, please be aware that minor errors in transcription may be present.    Juan Dominguez MD    Shoulder/Elbow Surgery  [facility]

## 2024-01-02 ENCOUNTER — OFFICE VISIT (OUTPATIENT)
Dept: ORTHOPEDIC SURGERY | Facility: CLINIC | Age: 69
End: 2024-01-02
Payer: MEDICARE

## 2024-01-02 DIAGNOSIS — M77.12 LATERAL EPICONDYLITIS OF LEFT ELBOW: Primary | ICD-10-CM

## 2024-01-02 DIAGNOSIS — M25.522 LEFT ELBOW PAIN: ICD-10-CM

## 2024-01-02 PROCEDURE — 1036F TOBACCO NON-USER: CPT | Performed by: STUDENT IN AN ORGANIZED HEALTH CARE EDUCATION/TRAINING PROGRAM

## 2024-01-02 PROCEDURE — 99203 OFFICE O/P NEW LOW 30 MIN: CPT | Performed by: STUDENT IN AN ORGANIZED HEALTH CARE EDUCATION/TRAINING PROGRAM

## 2024-01-02 PROCEDURE — 99213 OFFICE O/P EST LOW 20 MIN: CPT | Performed by: STUDENT IN AN ORGANIZED HEALTH CARE EDUCATION/TRAINING PROGRAM

## 2024-01-02 PROCEDURE — 1126F AMNT PAIN NOTED NONE PRSNT: CPT | Performed by: STUDENT IN AN ORGANIZED HEALTH CARE EDUCATION/TRAINING PROGRAM

## 2024-01-02 PROCEDURE — 1159F MED LIST DOCD IN RCRD: CPT | Performed by: STUDENT IN AN ORGANIZED HEALTH CARE EDUCATION/TRAINING PROGRAM

## 2024-01-10 DIAGNOSIS — I10 BENIGN ESSENTIAL HYPERTENSION: Primary | ICD-10-CM

## 2024-01-10 RX ORDER — METOPROLOL TARTRATE 25 MG/1
25 TABLET, FILM COATED ORAL DAILY
Qty: 90 TABLET | Refills: 3 | Status: SHIPPED | OUTPATIENT
Start: 2024-01-10 | End: 2024-05-29

## 2024-01-15 ENCOUNTER — TELEPHONE (OUTPATIENT)
Dept: OBSTETRICS AND GYNECOLOGY | Facility: CLINIC | Age: 69
End: 2024-01-15
Payer: MEDICARE

## 2024-01-15 NOTE — TELEPHONE ENCOUNTER
Pt contacted.   C/o left breast pain past 2 weeks or so.  Remote Hx left breast lumpectomy, s/p radiation 2006.  Had normal mmg last month.   Pt denies feeing any lumps.  Nurse will make dr santiago aware.

## 2024-01-15 NOTE — TELEPHONE ENCOUNTER
Pt contacted.    Pt aware to make appt for clinical breast exam per dr santiago.    Pt transferred to  to schedule.

## 2024-01-18 ENCOUNTER — OFFICE VISIT (OUTPATIENT)
Dept: OBSTETRICS AND GYNECOLOGY | Facility: CLINIC | Age: 69
End: 2024-01-18
Payer: MEDICARE

## 2024-01-18 VITALS
DIASTOLIC BLOOD PRESSURE: 80 MMHG | BODY MASS INDEX: 25.39 KG/M2 | SYSTOLIC BLOOD PRESSURE: 136 MMHG | WEIGHT: 158 LBS | HEIGHT: 66 IN

## 2024-01-18 DIAGNOSIS — N64.4 BREAST PAIN IN FEMALE: Primary | ICD-10-CM

## 2024-01-18 PROCEDURE — 1036F TOBACCO NON-USER: CPT | Performed by: STUDENT IN AN ORGANIZED HEALTH CARE EDUCATION/TRAINING PROGRAM

## 2024-01-18 PROCEDURE — 99213 OFFICE O/P EST LOW 20 MIN: CPT | Performed by: STUDENT IN AN ORGANIZED HEALTH CARE EDUCATION/TRAINING PROGRAM

## 2024-01-18 PROCEDURE — 3079F DIAST BP 80-89 MM HG: CPT | Performed by: STUDENT IN AN ORGANIZED HEALTH CARE EDUCATION/TRAINING PROGRAM

## 2024-01-18 PROCEDURE — 1126F AMNT PAIN NOTED NONE PRSNT: CPT | Performed by: STUDENT IN AN ORGANIZED HEALTH CARE EDUCATION/TRAINING PROGRAM

## 2024-01-18 PROCEDURE — 3075F SYST BP GE 130 - 139MM HG: CPT | Performed by: STUDENT IN AN ORGANIZED HEALTH CARE EDUCATION/TRAINING PROGRAM

## 2024-01-18 PROCEDURE — 1160F RVW MEDS BY RX/DR IN RCRD: CPT | Performed by: STUDENT IN AN ORGANIZED HEALTH CARE EDUCATION/TRAINING PROGRAM

## 2024-01-18 PROCEDURE — 1159F MED LIST DOCD IN RCRD: CPT | Performed by: STUDENT IN AN ORGANIZED HEALTH CARE EDUCATION/TRAINING PROGRAM

## 2024-01-18 NOTE — ASSESSMENT & PLAN NOTE
- Most recent mammogram 12/2023, BIRADS-2  - Current exam consistent with post-radiation changes, no evidence of infection or acute process  - Discussed with patient that given history of radiation, recommend evaluation by breast surgeon for consideration of further work up. Reviewed low suspicion for malignancy given recently normal mammogram; however, defer to expertise of breast surgeon/oncologist for further recommendations  - Referral placed and information for providers at [facility]

## 2024-01-18 NOTE — PROGRESS NOTES
Subjective   Patient ID: Elsa Rojo is a 68 y.o. female who presents for Breast Pain (Left side).    Pt presents today with complaint of left sided breast pain starting in December after her mammogram. History of lumpectomy, lymph node dissection and radiation of left breast for breast cancer in 2006. Reports pain is at site of prior radiation, radiating along chest wall to axilla. Not currently bothersome, but concerned given history of malignancy.    No other symptoms. No fevers, chills, swelling, erythema.    Objective   Physical Exam  Constitutional:       General: She is not in acute distress.     Appearance: Normal appearance.   HENT:      Head: Normocephalic.   Cardiovascular:      Rate and Rhythm: Normal rate.   Pulmonary:      Effort: Pulmonary effort is normal. No respiratory distress.   Chest:   Breasts:     Right: No swelling, mass, nipple discharge, skin change or tenderness.          Comments: No erythema, edema, fluctuance, drainage, tenderness of left breast. No overlying skin changes.  Lymphadenopathy:      Upper Body:      Right upper body: No supraclavicular, axillary or pectoral adenopathy.      Left upper body: No supraclavicular, axillary or pectoral adenopathy.   Skin:     General: Skin is warm and dry.   Neurological:      Mental Status: She is alert and oriented to person, place, and time.   Psychiatric:         Mood and Affect: Mood normal.         Behavior: Behavior normal.         Thought Content: Thought content normal.         Judgment: Judgment normal.         Assessment/Plan   Problem List Items Addressed This Visit             ICD-10-CM    Breast pain in female - Primary N64.4     - Most recent mammogram 12/2023, BIRADS-2  - Current exam consistent with post-radiation changes, no evidence of infection or acute process  - Discussed with patient that given history of radiation, recommend evaluation by breast surgeon for consideration of further work up. Reviewed low suspicion for  malignancy given recently normal mammogram; however, defer to expertise of breast surgeon/oncologist for further recommendations  - Referral placed and information for providers at [facility] given         Relevant Orders    Referral to Breast Surgery            Matt Ma MD 01/18/24 11:06 AM

## 2024-01-26 ENCOUNTER — HOSPITAL ENCOUNTER (OUTPATIENT)
Dept: RADIOLOGY | Facility: CLINIC | Age: 69
Discharge: HOME | End: 2024-01-26
Payer: MEDICARE

## 2024-01-26 ENCOUNTER — OFFICE VISIT (OUTPATIENT)
Dept: PRIMARY CARE | Facility: CLINIC | Age: 69
End: 2024-01-26
Payer: MEDICARE

## 2024-01-26 VITALS
TEMPERATURE: 98 F | HEART RATE: 53 BPM | DIASTOLIC BLOOD PRESSURE: 83 MMHG | SYSTOLIC BLOOD PRESSURE: 134 MMHG | OXYGEN SATURATION: 98 % | BODY MASS INDEX: 25.3 KG/M2 | WEIGHT: 157.4 LBS | HEIGHT: 66 IN

## 2024-01-26 DIAGNOSIS — I10 BENIGN ESSENTIAL HYPERTENSION: Primary | ICD-10-CM

## 2024-01-26 DIAGNOSIS — M25.551 RIGHT HIP PAIN: ICD-10-CM

## 2024-01-26 DIAGNOSIS — F33.9 DEPRESSION, RECURRENT (CMS-HCC): ICD-10-CM

## 2024-01-26 PROBLEM — M20.30 ACQUIRED HALLUX MALLEUS: Status: ACTIVE | Noted: 2023-07-19

## 2024-01-26 PROBLEM — M77.12 LATERAL EPICONDYLITIS OF LEFT ELBOW: Status: ACTIVE | Noted: 2024-01-26

## 2024-01-26 PROBLEM — Z86.0101 HISTORY OF ADENOMATOUS POLYP OF COLON: Status: ACTIVE | Noted: 2024-01-26

## 2024-01-26 PROBLEM — R31.29 MICROSCOPIC HEMATURIA: Status: ACTIVE | Noted: 2023-07-19

## 2024-01-26 PROBLEM — Z86.010 HISTORY OF ADENOMATOUS POLYP OF COLON: Status: ACTIVE | Noted: 2024-01-26

## 2024-01-26 PROBLEM — K57.30 DIVERTICULOSIS OF COLON: Status: ACTIVE | Noted: 2024-01-26

## 2024-01-26 PROCEDURE — 1126F AMNT PAIN NOTED NONE PRSNT: CPT | Performed by: INTERNAL MEDICINE

## 2024-01-26 PROCEDURE — 1036F TOBACCO NON-USER: CPT | Performed by: INTERNAL MEDICINE

## 2024-01-26 PROCEDURE — 3075F SYST BP GE 130 - 139MM HG: CPT | Performed by: INTERNAL MEDICINE

## 2024-01-26 PROCEDURE — 73502 X-RAY EXAM HIP UNI 2-3 VIEWS: CPT | Mod: RT

## 2024-01-26 PROCEDURE — 1160F RVW MEDS BY RX/DR IN RCRD: CPT | Performed by: INTERNAL MEDICINE

## 2024-01-26 PROCEDURE — 3079F DIAST BP 80-89 MM HG: CPT | Performed by: INTERNAL MEDICINE

## 2024-01-26 PROCEDURE — 1159F MED LIST DOCD IN RCRD: CPT | Performed by: INTERNAL MEDICINE

## 2024-01-26 PROCEDURE — 99214 OFFICE O/P EST MOD 30 MIN: CPT | Performed by: INTERNAL MEDICINE

## 2024-01-26 PROCEDURE — 1157F ADVNC CARE PLAN IN RCRD: CPT | Performed by: INTERNAL MEDICINE

## 2024-01-26 PROCEDURE — 73502 X-RAY EXAM HIP UNI 2-3 VIEWS: CPT | Mod: RIGHT SIDE | Performed by: RADIOLOGY

## 2024-01-26 ASSESSMENT — PATIENT HEALTH QUESTIONNAIRE - PHQ9
SUM OF ALL RESPONSES TO PHQ9 QUESTIONS 1 AND 2: 0
1. LITTLE INTEREST OR PLEASURE IN DOING THINGS: NOT AT ALL
2. FEELING DOWN, DEPRESSED OR HOPELESS: NOT AT ALL

## 2024-01-26 NOTE — PROGRESS NOTES
"Subjective   Patient ID: Elsa Rojo is a 68 y.o. female who presents for 6 month follow up.    Pt is here for follow up on HTN and medical condition.  Her left elbow pain improved,was seen by ortho,wore a brace.  She continue to have  right hip with walking at times.  She is also here to follow up on  insomnia, her  was recently diagnosed with metastatic esophageal cancer but now responding to treatment on his recent CT,she has been exercising , she also has good support from her friend and from her [employer] member,she is responding to Trazadone..  She has been taking 5 mg of melatonin.           Review of Systems   Constitutional: Negative.    HENT: Negative.     Eyes: Negative.    Respiratory: Negative.     Cardiovascular: Negative.    Gastrointestinal: Negative.    Genitourinary: Negative.    Musculoskeletal:         As HPI.   Neurological: Negative.    Hematological: Negative.    Psychiatric/Behavioral: Negative.         Objective   /83 (BP Location: Left arm, Patient Position: Sitting, BP Cuff Size: Adult)   Pulse 53   Temp 36.7 °C (98 °F) (Temporal)   Ht 1.676 m (5' 6\")   Wt 71.4 kg (157 lb 6.4 oz)   SpO2 98%   BMI 25.41 kg/m²     Physical Exam  Constitutional:       Appearance: Normal appearance.   HENT:      Head: Normocephalic and atraumatic.   Eyes:      Extraocular Movements: Extraocular movements intact.      Pupils: Pupils are equal, round, and reactive to light.   Cardiovascular:      Rate and Rhythm: Normal rate and regular rhythm.      Heart sounds: Normal heart sounds.   Pulmonary:      Effort: Pulmonary effort is normal.      Breath sounds: Normal breath sounds. No wheezing or rhonchi.   Abdominal:      General: Abdomen is flat. Bowel sounds are normal. There is no distension.      Palpations: Abdomen is soft.   Musculoskeletal:      Cervical back: Normal range of motion and neck supple.      Right lower leg: No edema.      Left lower leg: No edema.      Comments: R hip,fair ROM. "   Skin:     General: Skin is warm.   Neurological:      General: No focal deficit present.      Mental Status: She is alert and oriented to person, place, and time.   Psychiatric:         Mood and Affect: Mood normal.         Behavior: Behavior normal.         Assessment/Plan   Problem List Items Addressed This Visit             ICD-10-CM    Benign essential hypertension - Primary I10    Depression, recurrent (CMS/HCC) F33.9    Right hip pain M25.551    Relevant Orders    XR hip right with pelvis when performed 2 or 3 views (Completed)

## 2024-01-27 ASSESSMENT — ENCOUNTER SYMPTOMS
HEMATOLOGIC/LYMPHATIC NEGATIVE: 1
GASTROINTESTINAL NEGATIVE: 1
NEUROLOGICAL NEGATIVE: 1
CONSTITUTIONAL NEGATIVE: 1
EYES NEGATIVE: 1
PSYCHIATRIC NEGATIVE: 1
RESPIRATORY NEGATIVE: 1
CARDIOVASCULAR NEGATIVE: 1

## 2024-01-28 NOTE — RESULT ENCOUNTER NOTE
Your right hip x-ray showed mild osteoarthritis there is also calcification over the hamstring tendon which can be seen with tendinopathy, please see orthopedic for further evaluation ,call me if you need any referral.

## 2024-02-01 ENCOUNTER — PROCEDURE VISIT (OUTPATIENT)
Dept: PODIATRY | Facility: CLINIC | Age: 69
End: 2024-02-01
Payer: MEDICARE

## 2024-02-01 DIAGNOSIS — M79.674 PAIN IN TOES OF BOTH FEET: Primary | ICD-10-CM

## 2024-02-01 DIAGNOSIS — M79.675 PAIN IN TOES OF BOTH FEET: Primary | ICD-10-CM

## 2024-02-01 DIAGNOSIS — L60.0 INGROWN TOENAIL: ICD-10-CM

## 2024-02-01 DIAGNOSIS — B35.1 ONYCHOMYCOSIS: ICD-10-CM

## 2024-02-01 PROCEDURE — 11721 DEBRIDE NAIL 6 OR MORE: CPT | Performed by: PODIATRIST

## 2024-02-01 NOTE — PROGRESS NOTES
History Of Present Illness  Elsa Rojo is a 68 y.o. female presenting with painful elongated nails.     Past Medical History  She has a past medical history of Cellulitis, unspecified (12/04/2013), Other conditions influencing health status, Other conditions influencing health status, Other conditions influencing health status, Other conditions influencing health status (10/29/2013), Other microscopic hematuria, Personal history of colonic polyps, and Personal history of other diseases of the digestive system (10/13/2014).    Surgical History  She has a past surgical history that includes Colonoscopy (12/31/2012); Breast lumpectomy (Left, 01/01/2006); Other surgical history (12/31/2012); Other surgical history (12/31/2012); Other surgical history (01/11/2014); and Lymphadenectomy.     Social History  She reports that she quit smoking about 18 years ago. Her smoking use included cigarettes. She has never used smokeless tobacco. She reports current alcohol use. She reports that she does not currently use drugs.    Family History  Family History   Problem Relation Name Age of Onset    Motor neuron disease Mother      Lung cancer Father      Lymphoma Sister          Allergies  Cephalexin, Bacitracin zinc-polymyxin b, Bupropion, Penicillins, Bacitracin, and Neomycin    Medications  Current Outpatient Medications   Medication Sig Dispense Refill    aspirin 81 mg EC tablet Take 1 tablet (81 mg) by mouth once daily.      atorvastatin (Lipitor) 20 mg tablet Take 1 tablet (20 mg) by mouth once daily. 90 tablet 3    betamethasone, augmented, (Diprolene, augmented,) 0.05 % ointment Apply topically 2 times a day. 15 g 0    CALCIUM ORAL Take 1 tablet by mouth once daily.      cholecalciferol (Vitamin D-3) 50 mcg (2,000 unit) capsule Take 2 capsules (100 mcg) by mouth once daily.      metoprolol tartrate (Lopressor) 25 mg tablet Take 1 tablet (25 mg) by mouth once daily. 90 tablet 3    traZODone (Desyrel) 50 mg tablet Take 1  tablet (50 mg) by mouth as needed at bedtime for sleep. 90 tablet 3     No current facility-administered medications for this visit.       Review of Systems    REVIEW OF SYSTEMS  GENERAL:  Negative for malaise, significant weight loss, fever  CARDIOVASCULAR: leg swelling   MUSCULOSKELETAL:  Negative for joint pain or swelling, back pain, and muscle pain.  SKIN:  Negative for lesions, rash, and itching  PSYCH:  Negative for sleep disturbance, mood disorder and recent psychosocial stressors  NEURO: Negative, denies any burning, tingling or numbness     Objective:   Vasc: DP and PT pulses are palpable bilateral.  CFT is less than 3 seconds bilateral.  Skin temperature is warm to cool proximal to distal bilateral.      Neuro:  Light touch is intact to the foot bilateral.      Derm: Nails 1-5 bilateral are thickened, elongated and crumbly with subungual debris. Skin is supple with normal texture and turgor noted.  Webspaces are clean, dry and intact bilateral.  There are no hyperkeratoses, ulcerations, verruca or other lesions noted.  Ingrown borders first b/l    Ortho: Muscle strength is 5/5 for all pedal groups tested.  Ankle joint, subtalar joint, 1st MPJ and lesser MPJ ROM is full and without pain or crepitus.  The foot type is rectus bilateral off weight bearing.  There are no structural deformities noted.    Assessment/Plan     Diagnoses and all orders for this visit:  Pain in toes of both feet  Onychomycosis  Ingrown toenail      Toenails are debrided in length and thickness to avoid infection and for pain relief     Neida Martin-Daniel, JONATHAN  [id number] [place] [address]

## 2024-02-09 ENCOUNTER — OFFICE VISIT (OUTPATIENT)
Dept: SURGICAL ONCOLOGY | Facility: HOSPITAL | Age: 69
End: 2024-02-09
Payer: MEDICARE

## 2024-02-09 VITALS
RESPIRATION RATE: 16 BRPM | DIASTOLIC BLOOD PRESSURE: 80 MMHG | SYSTOLIC BLOOD PRESSURE: 120 MMHG | HEIGHT: 67 IN | HEART RATE: 74 BPM | BODY MASS INDEX: 24.64 KG/M2 | WEIGHT: 157 LBS

## 2024-02-09 DIAGNOSIS — N64.4 BREAST PAIN IN FEMALE: Primary | ICD-10-CM

## 2024-02-09 DIAGNOSIS — Z85.3 HISTORY OF INVASIVE BREAST CANCER: ICD-10-CM

## 2024-02-09 PROCEDURE — 1036F TOBACCO NON-USER: CPT | Performed by: NURSE PRACTITIONER

## 2024-02-09 PROCEDURE — 1160F RVW MEDS BY RX/DR IN RCRD: CPT | Performed by: NURSE PRACTITIONER

## 2024-02-09 PROCEDURE — 1126F AMNT PAIN NOTED NONE PRSNT: CPT | Performed by: NURSE PRACTITIONER

## 2024-02-09 PROCEDURE — 1159F MED LIST DOCD IN RCRD: CPT | Performed by: NURSE PRACTITIONER

## 2024-02-09 PROCEDURE — 99213 OFFICE O/P EST LOW 20 MIN: CPT | Performed by: NURSE PRACTITIONER

## 2024-02-09 PROCEDURE — 1157F ADVNC CARE PLAN IN RCRD: CPT | Performed by: NURSE PRACTITIONER

## 2024-02-09 PROCEDURE — 3079F DIAST BP 80-89 MM HG: CPT | Performed by: NURSE PRACTITIONER

## 2024-02-09 PROCEDURE — 3074F SYST BP LT 130 MM HG: CPT | Performed by: NURSE PRACTITIONER

## 2024-02-09 NOTE — PROGRESS NOTES
[facility] [place]  Elsa Rojo female   1955 68 y.o.   [id number]      Chief Complaint  New patient, left breast pain, history left breast cancer.    History Of Present Illness  Elsa Rojo is a very pleasant 68 y.o.  female diagnosed 2006 with left breast invasive ductal carcinoma, ER+/WI+/HER2-. She underwent a left breast partial mastectomy with sentinel lymph node biopsy (0/3). She completed adjuvant radiation therapy and was part of a clinical trial. She started Anastrozole 2006 and was switched to Femara which she completed 2020. She has no family history of breast cancer. She presents today for left axillary/breast pain along with left breast itching. She does not have the pain any more and feels it was probably a muscle strain getting her boots off.     BREAST IMAGIN2024 Bilateral screening mammogram, indicates BI-RADS Category 2.     REPRODUCTIVE HISTORY:  menarche age 10, nulliparous, has one adopted child, OCP's x 2 years, natural menopause age 46, no HRT, scattered fibroglandular tissue    FAMILY CANCER HISTORY:   Maternal Grandmother: Stomach cancer, age 58  Father: Lung cancer, age 58 (cigar smoker)     Review of Systems  Constitutional:  Negative for appetite change, fatigue, fever and unexpected weight change.   HENT:  Negative for ear pain, hearing loss, nosebleeds, sore throat and trouble swallowing.    Eyes:  Negative for discharge, itching and visual disturbance.   Breast: As stated in HPI.  Respiratory:  Negative for cough, chest tightness and shortness of breath.    Cardiovascular:  Negative for chest pain, palpitations and leg swelling.   Gastrointestinal:  Negative for abdominal pain, constipation, diarrhea and nausea.   Endocrine: Negative for cold intolerance and heat intolerance.   Genitourinary:  Negative for dysuria, frequency, hematuria, pelvic pain and vaginal bleeding.   Musculoskeletal:  Negative for arthralgias, back pain, gait problem, joint  swelling and myalgias.   Skin:  Negative for color change and rash.   Allergic/Immunologic: Negative for environmental allergies and food allergies.   Neurological:  Negative for dizziness, tremors, speech difficulty, weakness, numbness and headaches.   Hematological:  Does not bruise/bleed easily.   Psychiatric/Behavioral:  Negative for agitation, dysphoric mood and sleep disturbance. The patient is not nervous/anxious.      Past Medical History  She has a past medical history of Cellulitis, unspecified (12/04/2013), Other conditions influencing health status, Other conditions influencing health status, Other conditions influencing health status, Other conditions influencing health status (10/29/2013), Other microscopic hematuria, Personal history of colonic polyps, and Personal history of other diseases of the digestive system (10/13/2014).    Surgical History  She has a past surgical history that includes Colonoscopy (12/31/2012); Breast lumpectomy (Left, 01/01/2006); Other surgical history (12/31/2012); Other surgical history (12/31/2012); Other surgical history (01/11/2014); and Lymphadenectomy.    Family History  Cancer-related family history includes Lung cancer in her father; Lymphoma in her sister.     Social History  She reports that she quit smoking about 18 years ago. Her smoking use included cigarettes. She has never used smokeless tobacco. She reports current alcohol use. She reports that she does not currently use drugs.    Allergies  Cephalexin, Bacitracin zinc-polymyxin b, Bupropion, Penicillins, Bacitracin, and Neomycin    Medications  Current Outpatient Medications   Medication Instructions    aspirin 81 mg, oral, Daily    atorvastatin (LIPITOR) 20 mg, oral, Daily    betamethasone, augmented, (Diprolene, augmented,) 0.05 % ointment Topical, 2 times daily    CALCIUM ORAL 1 tablet, oral, Daily    cholecalciferol (VITAMIN D-3) 100 mcg, oral, Daily    metoprolol tartrate (LOPRESSOR) 25 mg, oral, Daily     traZODone (DESYREL) 50 mg, oral, Nightly PRN       Last Recorded Vitals  Vitals:    02/09/24 1119   BP: 120/80   Pulse: 74   Resp: 16        Physical Exam  Chest:       Patient is alert and oriented x3 and in a relaxed and appropriate mood. Her gait is steady and hand grasps are equal. Sclera is clear. The breasts are asymmetrical right > left. The left breast has a well healed partial mastectomy incision with tissue divot and axillary incision. The tissue is thickened s/p radiation changes. The breast tissue is soft without palpable abnormalities, discrete nodules or masses. The skin and nipples appear normal. There is no cervical, supraclavicular or axillary lymphadenopathy. Heart rate and rhythm normal, S1 and S2 appreciated. The lungs are clear to auscultation bilaterally. Abdomen is soft and non-tender.     Relevant Results and Imaging  BI mammo bilateral screening tomosynthesis 12/14/2023    Narrative  Interpreted By:  Edwar Covarrubias,  STUDY:  BI MAMMO BILATERAL SCREENING TOMOSYNTHESIS; 12/14/2023 8:27 am    ACCESSION NUMBER(S):  [id number]    ORDERING CLINICIAN:  ROGE POLLOCK    INDICATION:  Screening. History of left lumpectomy and radiation.    COMPARISON:  12/09/2021, 12/12/2022    FINDINGS:  2D and tomosynthesis images were reviewed at 1 mm slice thickness.    Density:  There are areas of scattered fibroglandular tissue.    Postsurgical changes are noted in the left breast as well as  radiation therapy changes. No new suspicious masses or calcifications  are identified.    Impression  No mammographic evidence of malignancy.    BI-RADS CATEGORY:  BI-RADS Category:  2 Benign.  Recommendation:  Routine Screening Mammogram in 1 Year.  Recommended Date:  1 Year.  Laterality:  Bilateral.    For any future breast imaging appointments, please call [phone] ([phone]).      MACRO:  None    Signed by: Edwar Covarrubias 12/19/2023 9:38 AM  Dictation workstation:   [id number]      Time was spent viewing digital  images. I explained the results in depth, along with suggested explanation for follow up recommendations based on the testing results. BI-RADS Category 2    Visit Diagnosis  1. Breast pain in female  Referral to Breast Surgery      2. History of invasive breast cancer          Assessment/Plan  Normal clinical exam and imaging, left breast pain, history of left breast cancer, s/p partial mastectomy, completed radiation therapy, completed just about 5 years of endocrine therapy, no family history of breast cancer, scattered fibroglandular tissue    Plan:  Return to PCP for annual mammograms and exams. Prosthesis bra prescription given.     Patient Discussion/Summary  Your clinical examination and imaging are normal. You no longer need to be seen by a breast specialist for an annual physical breast examination. It is important to continue annual screening mammograms and breast exams through your primary care provider. Please return to see me if you have a new breast problem or abnormal mammogram. It has been a pleasure having you as a patient.     You can see your health information, review clinical summaries from office visits & test results online when you follow your health with MY  Chart, a personal health record. To sign up go to [email or web address]. If you need assistance with signing up or trouble getting into your account call [facility] MD Patient Line 24/7 at [phone].    My office phone number is [phone] if you need to get in touch with me or have additional questions or concerns. Thank you for choosing [facility] and trusting me as your healthcare provider. I look forward to seeing you again at your next office visit. I am honored to be a provider on your health care team and I remain dedicated to helping you achieve your health goals.    Vanna Briceno, TONO-CNP

## 2024-02-19 DIAGNOSIS — G47.09 OTHER INSOMNIA: ICD-10-CM

## 2024-02-19 RX ORDER — TRAZODONE HYDROCHLORIDE 50 MG/1
50 TABLET ORAL NIGHTLY PRN
Qty: 90 TABLET | Refills: 3 | Status: SHIPPED | OUTPATIENT
Start: 2024-02-19 | End: 2025-02-18

## 2024-02-22 NOTE — PROGRESS NOTES
Autumn Mart MD   Adult Reconstruction and Joint Replacement Surgery  Phone: [phone]     Fax: [phone]     INITIAL CONSULTATION      Date of Visit:  3/15/2024    CC: Right hip pain    HPI:  Elsa Rojo is a 68 y.o. female who presents with 6 months of RIGHT hip pain. They were referred by Dr. Esquivel PCP.    Patient has tried the following Ice, NSAIDs, Activity modification, and Xray. Date of last steroid injection: none. Patient does not have pain at night. Patient is able to walk unlimited blocks. Patient is currently using nothing as assistive device. Primarily complains of lateral hip pain and ASIS region. Patient has difficulty with climbing stairs. The pain is significantly impacting her ability to perform activities of daily living. Patient reports no longer able to do activities such as walking up stairs. Still able to attend the gym to walk and bike.     Focused History  PMH: Reviewed, Coronary artery disease, and PE/DVT: none  Breast cancer 2006.   PSH: Reviewed , Hip/Knee replacement: no, Hip/Knee surgery: no, Anesthesia complications: no, Spine surgery: no, Surgical infection: no, and Weight loss surgery: no  Meds: Reviewed, Current Anticoagulants: no, Weight loss medication: no, and Current Opioids: no  Allergies: Reviewed  and The patient reports no contraindications or allergies to cephalosporins, aspirin, NSAIDs or opioids, except as noted above.  FH: No family history of any bleeding or clotting disorders.  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    Other microscopic hematuria     Microscopic hematuria    Personal history of colonic polyps     History of adenomatous polyp of colon    Personal history of other diseases of the digestive system 10/13/2014    History of hemorrhoids       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     Past Surgical History:   Procedure Laterality Date    BREAST LUMPECTOMY Left 01/01/2006 2006 Left breast lumpectomy, XRT    COLONOSCOPY  12/31/2012    Complete Colonoscopy    LYMPHADENECTOMY      breast cancer    OTHER SURGICAL HISTORY  12/31/2012    Radiation Therapy    OTHER SURGICAL HISTORY  12/31/2012    Stress Test ECG Performed    OTHER SURGICAL HISTORY  01/11/2014    Pap smear for cervical cancer screening       Allergies: She is allergic to cephalexin, bacitracin zinc-polymyxin b, bupropion, penicillins, bacitracin, and neomycin.     Medications:  Current Outpatient Medications   Medication Instructions    aspirin 81 mg, oral, Daily    atorvastatin (LIPITOR) 20 mg, oral, Daily    betamethasone, augmented, (Diprolene, augmented,) 0.05 % ointment Topical, 2 times daily    CALCIUM ORAL 1 tablet, oral, Daily    cholecalciferol (VITAMIN D-3) 100 mcg, oral, Daily    metoprolol tartrate (LOPRESSOR) 25  mg, oral, Daily    traZODone (DESYREL) 50 mg, oral, Nightly PRN       Family History   Problem Relation Name Age of Onset    Motor neuron disease Mother      Lung cancer Father      Lymphoma Sister       Documented in chart and reviewed.     Social History     Tobacco Use    Smoking status: Former     Types: Cigarettes     Quit date:      Years since quittin.2    Smokeless tobacco: Never   Substance Use Topics    Alcohol use: Yes     Comment: VERY SOCIAL       Review of Systems: Review of systems completed with medical assistant intake. Please refer to this note.     Falls: The patient denies any recent falls or fall-related injuries.    Physical Exam:  BMI: 25, which is normal.     Constitutional: The patient is well-appearing and well groomed.     Neurological/Psychiatric: The patient is alert and oriented to person, place and time. The patient has a normal mood and affect.    Skin Examination: The skin over the right lower extremity, left lower extremity, right upper extremity, and left upper extremity is intact without any evidence of infection or rash.    Cardiovascular Examination: There are no varicosities and the skin is normal temperature, capillary refill normal, arterial pulses normal, no edema.    Lymphatic Examination: There is no lymphatic swelling or palpable lymph nodes present around the involved joint.    Neurological Examination: Bilateral lower extremities are grossly neurologically intact. Sensation normal, motor function normal.    Gait: The patient ambulates with a coxalgic gait.     Lumbar spine:    No tenderness to palpation midline.    Negative straight leg raise bilaterally.    Right Hip Examination:  Gait: Coxalgic gait.    Examination of the hip reveals the skin to be intact.    There is mild tenderness over the greater trochanter.    There is no obvious swelling.    There is tenderness over the hip flexor complex. There is pain with resisted hip flexion.     Negative lissy test.  "    There is a negative Stinchfield test.    Range of motion is: full extension to 100 degrees of flexion.    The hip internally rotates to 15 degrees and externally rotates to 40 degrees.    Abduction is 40 degrees and adduction is 15 degrees.    There is groin and buttock pain with hip motion.    There is a negative straight leg raise.    Left Hip Examination:  Examination of the hip reveals the skin to be intact.    There is no tenderness over the greater trochanter.    There is no obvious swelling.    There is a negative Stinchfield test.    Range of motion is full extension to 100 degrees of flexion.    The hip internally rotates to 20 and externally rotates 40 degrees.    Abduction is 50 degrees and adduction is 20 degrees.    There is no groin and buttock pain with hip motion.    There is a negative straight leg raise.    Right Knee Examination:  Examination of the right knee reveals the skin to be intact. There is no obvious swelling.    There is no tenderness to palpation.    Range of motion is full extension to 120 degrees of flexion.    The knee is stable.    There is no grinding with range of motion.    There is no patellofemoral crepitus.    Left Knee Examination:  Examination of the left knee reveals the skin to be intact. There is no obvious swelling.    There is no tenderness to palpation.    Range of motion is full extension to 120 degrees of flexion.    The knee is stable.    There is no grinding with range of motion.    There is no patellofemoral crepitus.    Prior Labs:   Lab Results   Component Value Date    WBC 6.8 07/25/2023    HGB 13.8 07/25/2023    HCT 43.7 07/25/2023    MCV 98 07/25/2023     07/25/2023      No results found for: \"INR\", \"PROTIME\"      Lab Results   Component Value Date    GLUCOSE 94 07/25/2023    CALCIUM 9.8 07/25/2023     07/25/2023    K 4.1 07/25/2023    CO2 31 07/25/2023     07/25/2023    BUN 14 07/25/2023    CREATININE 0.67 07/25/2023      No results " "found for: \"CKTOTAL\", \"CKMB\", \"CKMBINDEX\", \"TROPONINI\"   No results found for: \"HGBA1C\"      No results found for: \"CRP\"   No results found for: \"SEDRATE\"     Radiographs:  Radiographs were personally reviewed today with the patient. There is evidence of mild  RIGHT  hip osteoarthritis without bone on bone apposition. There is also mild ossification over the hamstring origin on the ischial tuberosity.     Impression:  68 y.o. year old female presents with mild  RIGHT  hip osteoarthritis without bone on bone apposition. This is now affecting activities of daily living. All appropriate nonsurgical management options have been tried and failed.    Diagnosis:   Hip flexor tightness, right    Trochanteric bursitis of right hip    Recommendations / Plan:    I have discussed the options in detail with the patient. We have discussed anti-inflammatory medication, activity modification, physical therapy, corticosteroid injections, and total hip replacement surgery. She is not a candidate for surgery.     The risks and benefits of all these treatment options have been discussed in detail. Recommend starting physical therapy for troch bursitis and hip flexor tendonitis. A referral was provided. She prefers to hold off from injections. She will take over the counter pain medications as needed. Encouraged them to maintain range of motion and strength around the hip and knee joints.  They will continue to implement these strategies in addressing their pain.       Recommend the patient continue optimizing nonsurgical treatment interventions as outlined above for management of their bursitis.  I would be happy to see them again at any point to discuss progress with this treatment regimen.  The patient verbalizes understanding with the recommendations and treatment plan as outlined above and is in agreement.  Questions were addressed.    _____________  Autumn Mart MD   [facility] "     Approximately 45 minutes were spent on the following tasks:              Preparing for the patient              Reviewing medical records              Taking a patient history              Performing a physical exam              Reviewing treatment options with the patient              Explaining the risks, potential benefits, and alternative to surgery  Explaining the expected rehabilitation after each treatment option  Explaining the potential long term expectations  Evaluating the diagnostic imaging     This office note was transcribed with dictation software.  Please excuse any typographical errors, program misunderstandings leading to inadvertent insertions or deletions of inappropriate wording, pronoun errors and other unintentional transcription errors not noticed on proof-reading.

## 2024-02-23 ENCOUNTER — APPOINTMENT (OUTPATIENT)
Dept: ORTHOPEDIC SURGERY | Facility: CLINIC | Age: 69
End: 2024-02-23
Payer: MEDICARE

## 2024-03-15 ENCOUNTER — OFFICE VISIT (OUTPATIENT)
Dept: ORTHOPEDIC SURGERY | Facility: CLINIC | Age: 69
End: 2024-03-15
Payer: MEDICARE

## 2024-03-15 DIAGNOSIS — M24.551 HIP FLEXOR TIGHTNESS, RIGHT: Primary | ICD-10-CM

## 2024-03-15 DIAGNOSIS — M70.61 TROCHANTERIC BURSITIS OF RIGHT HIP: ICD-10-CM

## 2024-03-15 PROCEDURE — 99214 OFFICE O/P EST MOD 30 MIN: CPT | Performed by: STUDENT IN AN ORGANIZED HEALTH CARE EDUCATION/TRAINING PROGRAM

## 2024-03-15 PROCEDURE — 1157F ADVNC CARE PLAN IN RCRD: CPT | Performed by: STUDENT IN AN ORGANIZED HEALTH CARE EDUCATION/TRAINING PROGRAM

## 2024-03-15 PROCEDURE — 1036F TOBACCO NON-USER: CPT | Performed by: STUDENT IN AN ORGANIZED HEALTH CARE EDUCATION/TRAINING PROGRAM

## 2024-03-15 PROCEDURE — 1159F MED LIST DOCD IN RCRD: CPT | Performed by: STUDENT IN AN ORGANIZED HEALTH CARE EDUCATION/TRAINING PROGRAM

## 2024-03-15 PROCEDURE — 1160F RVW MEDS BY RX/DR IN RCRD: CPT | Performed by: STUDENT IN AN ORGANIZED HEALTH CARE EDUCATION/TRAINING PROGRAM

## 2024-03-26 ENCOUNTER — EVALUATION (OUTPATIENT)
Dept: PHYSICAL THERAPY | Facility: CLINIC | Age: 69
End: 2024-03-26
Payer: MEDICARE

## 2024-03-26 DIAGNOSIS — M25.551 RIGHT HIP PAIN: Primary | ICD-10-CM

## 2024-03-26 DIAGNOSIS — M24.551 HIP FLEXOR TIGHTNESS, RIGHT: ICD-10-CM

## 2024-03-26 PROCEDURE — 97110 THERAPEUTIC EXERCISES: CPT | Mod: GP

## 2024-03-26 PROCEDURE — 97140 MANUAL THERAPY 1/> REGIONS: CPT | Mod: GP

## 2024-03-26 PROCEDURE — 97161 PT EVAL LOW COMPLEX 20 MIN: CPT | Mod: GP

## 2024-03-26 NOTE — PROGRESS NOTES
"Physical Therapy     Physical Therapy Evaluation        Patient Name: Elsa Rojo  MRN: [id number]  Today's Date: 3/26/2024  Time Calculation  Start Time: 0949  Stop Time: 1048  Time Calculation (min): 59 min    Reason for Visit  Referred by: Dr. Childress  Referral DX: right hip flexor tightness    Insurance:  Visit: #1  Authorized: [place]  Certification: 3-26-24 to 5-10-24  Payor: MEDICARE / Plan: MEDICARE PART A AND B / Product Type: *No Product type* /     Diagnosis:  1. Right hip pain    2. Hip flexor tightness, right        Subjective:  Date of Onset: 9-1-23  Chief Complaint: right hip pain  DEVON: unknown cause, gradual onset  HX: developed AM stiffness and pain around Sep '23; \"I have trouble getting up but after I move some it does improve. Also, I have pain when getting up from a chair or when I have to climb stairs.\" ; referred to therapy by PCP, x-ray: \"mild hip OA\"     Prior level of function: no prior limitation   Functional limitations: stair negotiation, tx out of chair, limps when walking distances  Home environment: stairs   Work status/occupation: retired  Recreational activity: walking, gym, biking     Patient stated goals for treatment: reduce pain     Reviewed medical screening history form with patient: completed 3-26-24      Precautions: no fall risk         Pain:  Location: right anterolateral hip   Nature: ache  Current pain: 0/10; Range: 0-7/10  Aggravating factor: ascending stairs, walking, getting out of a chair, movement after prolonged sitting  Alleviating factor: rest     Objective:  Observation/Posture: decreased right weight shift    AROM: Right hip: flex 110, IR 16, ER 28   Left hip: flex 122, IR 30, ER 30    PROM/mobility: decreased right hip mobility     MMT: hip ABD: R 3/5, L 3+/5    Flexibility: SLR BLE 70, Anthony (+) BLE    Balance: Reduced SLS to less than 5 seconds on either LE; able to hold longer with single UE support    Gait: Decreased stance RLE      Outcome Measure:   Other " Measures  Lower Extremity Funtional Score (LEFS): 29       Treatment:  Educated pt on course of therapy  Sup right hip distraction Gr III x 5 min  Sup pelvic tilt x 10  Sup hook lying bent knee fall out x 10 sec x 10  Sup hook lying hip ABD-ER isometric x 10  Sup hip flexor stretch x 60 sec x 3    OP Education: HEP: #3-6    Charges: Eval, TE x 1, Manual x 1    Assessment:   Pt presents with dx right hip tightness/pain. Demonstrates limited right hip motion and mobility with gluteal weakness. Pt will benefit from therapy to address deficits.     Plan:  Frequency/duration: 1x/wk for 6 weeks  Planned interventions: Therapeutic exercise (ROM, stretching, strengthening), manual therapy, balance/gait training, education and activity modification  Rehab Potential to achieve goals: good     Goals:  Improve LEFS > 9 by 6 weeks  Improve AROM right hip flexion to 120 degrees to allow pt to tx from low seat by 6 weeks  Improve MMT bilateral hip ABD to 4/5 to allow reciprocal stair negotiation over 10 stairs by 6 weeks  Resume recreational exercise without restriction by 6 weeks    Jose Juan Rosen, PT

## 2024-04-10 ENCOUNTER — TREATMENT (OUTPATIENT)
Dept: PHYSICAL THERAPY | Facility: CLINIC | Age: 69
End: 2024-04-10
Payer: MEDICARE

## 2024-04-10 DIAGNOSIS — E78.5 HYPERLIPIDEMIA, UNSPECIFIED HYPERLIPIDEMIA TYPE: ICD-10-CM

## 2024-04-10 DIAGNOSIS — M25.551 RIGHT HIP PAIN: Primary | ICD-10-CM

## 2024-04-10 PROCEDURE — 97140 MANUAL THERAPY 1/> REGIONS: CPT | Mod: GP

## 2024-04-10 PROCEDURE — 97110 THERAPEUTIC EXERCISES: CPT | Mod: GP

## 2024-04-10 RX ORDER — ATORVASTATIN CALCIUM 20 MG/1
20 TABLET, FILM COATED ORAL DAILY
Qty: 90 TABLET | Refills: 0 | Status: SHIPPED | OUTPATIENT
Start: 2024-04-10

## 2024-04-10 NOTE — PROGRESS NOTES
"Physical Therapy    Physical Therapy Treatment    Patient Name: Elsa Rojo  MRN: [id number]  Today's Date: 4/10/2024  Time Calculation  Start Time: 0801  Stop Time: 0848  Time Calculation (min): 47 min        Insurance:  Visit: #2  [place]  Certification: 3-26-24 to 5-10-24  Payor: MEDICARE / Plan: MEDICARE PART A AND B / Product Type: *No Product type* /     Subjective:  \"I've notice less pain when doing the stairs.\"    Current pain: 0/10; Range: 0-3/10    Objective:  AROM: right hip: flex: 115    Treatment:  Sup right hip distraction Gr III x 30 sec x 5  Sup pelvic tilt x 10   Sup hook lying bent knee fall out x 10 sec x 10  Sup hip flexor stretch x 60 sec x 3  Sup hook lying hip ABD-ER x isometric x 10  Sup bridge x 10  S/L hip ABD-ER x 10     OP Education: HEP: #6    Charges: Manual x 1, TE x 2        Diagnosis:  1. Right hip pain        Assessment:   Pt demonstrates good progress with increased right hip flexion ROM and significant reduction in pain reports.     Plan:  Continue with focus on improving right hip mobility and ROM.     Jose Juan Rosen, PT  "

## 2024-04-17 ENCOUNTER — TREATMENT (OUTPATIENT)
Dept: PHYSICAL THERAPY | Facility: CLINIC | Age: 69
End: 2024-04-17
Payer: MEDICARE

## 2024-04-17 DIAGNOSIS — M25.551 RIGHT HIP PAIN: ICD-10-CM

## 2024-04-17 DIAGNOSIS — M24.551 HIP FLEXOR TIGHTNESS, RIGHT: ICD-10-CM

## 2024-04-17 PROCEDURE — 97140 MANUAL THERAPY 1/> REGIONS: CPT | Mod: CQ,GP

## 2024-04-17 PROCEDURE — 97110 THERAPEUTIC EXERCISES: CPT | Mod: GP,CQ

## 2024-04-17 ASSESSMENT — PAIN SCALES - GENERAL: PAINLEVEL_OUTOF10: 2

## 2024-04-17 ASSESSMENT — PAIN - FUNCTIONAL ASSESSMENT: PAIN_FUNCTIONAL_ASSESSMENT: 0-10

## 2024-04-17 NOTE — PROGRESS NOTES
"      Physical Therapy Treatment    Patient Name: Elsa Rojo  MRN: [id number]  Today's Date: 4/17/2024  Time Calculation  Start Time: 0910  Stop Time: 0956  Time Calculation (min): 46 min  PT Therapeutic Procedures Time Entry  Manual Therapy Time Entry: 15  Therapeutic Exercise Time Entry: 31     Insurance   Visit: #3  [place]ized[place]  Certification: 3-26-24 to 5-10-24  Payor: MEDICARE / Plan: MEDICARE PART A AND B / Product Type: *No Product type* /     Current Problem:  1. Right hip pain        Subjective:  I feel so much better after the last person stretched me out. I noticed that I drive with my right side and when I got out of my car to walk up here I noticed it was aching.    Pain:  Pain Assessment: 0-10  Pain Score: 2    Objective:  AROM: right hip: flex: 110     Precautions  Precautions  Precautions Comment: none    Treatments:  Therapeutic Exercise 08085: Unit(s)- 2  Sup hip flexor stretch x 60 sec x 1 R/L  Sup pelvic tilt x 10   Supine march w/ TA- 1x15 R/L  Sup HL BKFO x 15 R/L  Sup hook lying hip ABD-ER x isometric w/ green mini band x 10 x 5\" hold  Supine SLR w/ pelvic tilt- 1x5x5\" hold R/L  Sup bridge x 10 x 5 \" hold  S/L hip ABD-ER x 15 R/L    Manual Therapy 47849: Unit(s)- 1  Supine GR III Mob Caudal glide  with strap in hip flexion 2x10  Supine GR III Mob Cuadual glide with strap  in hip flexion/er 2x10  Supine GR III Lateral glide with strap in flexion 2x10  Sup right hip long axis distraction Gr III x 30 sec x 5    Assessment:  Pt demonstrates good progress with increased right hip flexion ROM and significant reduction in pain reports. Pt. States relief from manual and was able to tolerate all exercises this date w/o increase in sx.    Plan:  Continue with focus on improving right hip mobility and ROM.     The following treatment was performed by KM Roy under direction supervision of DOREEN.  "

## 2024-04-24 ENCOUNTER — APPOINTMENT (OUTPATIENT)
Dept: PHYSICAL THERAPY | Facility: CLINIC | Age: 69
End: 2024-04-24
Payer: MEDICARE

## 2024-04-25 ENCOUNTER — TELEPHONE (OUTPATIENT)
Dept: PRIMARY CARE | Facility: CLINIC | Age: 69
End: 2024-04-25

## 2024-04-25 ENCOUNTER — TELEMEDICINE (OUTPATIENT)
Dept: PRIMARY CARE | Facility: CLINIC | Age: 69
End: 2024-04-25
Payer: MEDICARE

## 2024-04-25 DIAGNOSIS — J06.9 URTI (ACUTE UPPER RESPIRATORY INFECTION): Primary | ICD-10-CM

## 2024-04-25 PROCEDURE — 1157F ADVNC CARE PLAN IN RCRD: CPT | Performed by: INTERNAL MEDICINE

## 2024-04-25 PROCEDURE — 1160F RVW MEDS BY RX/DR IN RCRD: CPT | Performed by: INTERNAL MEDICINE

## 2024-04-25 PROCEDURE — 1159F MED LIST DOCD IN RCRD: CPT | Performed by: INTERNAL MEDICINE

## 2024-04-25 PROCEDURE — 99214 OFFICE O/P EST MOD 30 MIN: CPT | Performed by: INTERNAL MEDICINE

## 2024-04-25 RX ORDER — BENZONATATE 100 MG/1
100 CAPSULE ORAL 3 TIMES DAILY PRN
Qty: 42 CAPSULE | Refills: 0 | Status: SHIPPED | OUTPATIENT
Start: 2024-04-25 | End: 2024-05-25

## 2024-04-25 RX ORDER — AZITHROMYCIN 250 MG/1
TABLET, FILM COATED ORAL DAILY
Qty: 6 TABLET | Refills: 0 | Status: SHIPPED | OUTPATIENT
Start: 2024-04-25 | End: 2024-04-30

## 2024-04-25 NOTE — TELEPHONE ENCOUNTER
Patient states she had a VV appt with you this morning - states she did a Covid test and it was positive.   States her 's test was negative

## 2024-04-25 NOTE — PROGRESS NOTES
Subjective   Patient ID: [id number]     Elsa Rojo is a 68 y.o. female who presents for URI (Sx started this past Monday with headache, fatigue, cough, congestion. Denies any sinus pressure, drainage, ear pain, sore throat, fever, SOB, or wheezing. Pt took a at home covid test that showed up positive but pt states that it was . ).    Current Outpatient Medications:     aspirin 81 mg EC tablet, Take 1 tablet (81 mg) by mouth once daily., Disp: , Rfl:     atorvastatin (Lipitor) 20 mg tablet, Take 1 tablet (20 mg) by mouth once daily., Disp: 90 tablet, Rfl: 0    betamethasone, augmented, (Diprolene, augmented,) 0.05 % ointment, Apply topically 2 times a day., Disp: 15 g, Rfl: 0    CALCIUM ORAL, Take 1 tablet by mouth once daily., Disp: , Rfl:     cholecalciferol (Vitamin D-3) 50 mcg (2,000 unit) capsule, Take 2 capsules (100 mcg) by mouth once daily., Disp: , Rfl:     metoprolol tartrate (Lopressor) 25 mg tablet, Take 1 tablet (25 mg) by mouth once daily., Disp: 90 tablet, Rfl: 3    traZODone (Desyrel) 50 mg tablet, Take 1 tablet (50 mg) by mouth as needed at bedtime for sleep., Disp: 90 tablet, Rfl: 3  HPI  Virtual or Telephone Consent    An interactive audio and video telecommunication system which permits real time communications between the patient (at the originating site) and provider (at the distant site) was utilized to provide this telehealth service.   Verbal consent was requested and obtained from Elsa Rojo on this date, 24 for a telehealth visit.      68-year-old patient participated in this virtual visit to discuss respiratory symptoms.  Patient stated that on  she did not feel good she felt tired and low energy and she had a headache and on Monday she started experiencing cough that is slightly productive.  She does not have sore throat but she does have excessive postnasal drainage and congestion and headaches.  Her  is immunocompromised she took an  COVID test which she  is not 100% certain that it was positive she is going to repeat the test.  No fever chills or rigors no shortness of breath or wheezing no loss of taste or loss of smell.  Review of system was reviewed all normal except what is noted in HPI   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    Other microscopic hematuria     Microscopic hematuria    Personal history of colonic polyps     History of adenomatous polyp of colon    Personal history of other diseases of the digestive system 10/13/2014    History of hemorrhoids      Objective   There were no vitals taken for this visit.     Physical Exam    Assessment/Plan   Problem List Items Addressed This Visit    None  68-year-old patient with the following issues.    1.  Symptoms suggestive of upper respiratory tract infection, at this point we are going to treat the patient with a course of antibiotic a prescription for Zithromax was sent to the pharmacy along with benzonatate for cough control patient was encouraged to increase her fluid intake and to take 1 g of vitamin C daily.  She is going to test for COVID but even if she test positive because her symptoms are mild treatment will be supportive patient was instructed to contact us if her symptoms worsen or do not improve all her questions were addressed no other active issues or concerns.    Liv Marie MD

## 2024-05-03 ENCOUNTER — APPOINTMENT (OUTPATIENT)
Dept: PHYSICAL THERAPY | Facility: CLINIC | Age: 69
End: 2024-05-03
Payer: MEDICARE

## 2024-05-29 DIAGNOSIS — I10 BENIGN ESSENTIAL HYPERTENSION: ICD-10-CM

## 2024-05-29 RX ORDER — METOPROLOL TARTRATE 25 MG/1
25 TABLET, FILM COATED ORAL DAILY
Qty: 90 TABLET | Refills: 0 | Status: SHIPPED | OUTPATIENT
Start: 2024-05-29 | End: 2024-06-04 | Stop reason: SDUPTHER

## 2024-05-29 RX ORDER — METOPROLOL TARTRATE 25 MG/1
25 TABLET, FILM COATED ORAL DAILY
Qty: 180 TABLET | Refills: 0 | OUTPATIENT
Start: 2024-05-29

## 2024-06-04 ENCOUNTER — TREATMENT (OUTPATIENT)
Dept: PHYSICAL THERAPY | Facility: CLINIC | Age: 69
End: 2024-06-04
Payer: MEDICARE

## 2024-06-04 DIAGNOSIS — M24.551 HIP FLEXOR TIGHTNESS, RIGHT: ICD-10-CM

## 2024-06-04 DIAGNOSIS — I10 BENIGN ESSENTIAL HYPERTENSION: ICD-10-CM

## 2024-06-04 DIAGNOSIS — M25.551 RIGHT HIP PAIN: Primary | ICD-10-CM

## 2024-06-04 PROCEDURE — 97110 THERAPEUTIC EXERCISES: CPT | Mod: GP

## 2024-06-04 RX ORDER — METOPROLOL TARTRATE 25 MG/1
25 TABLET, FILM COATED ORAL 2 TIMES DAILY
Qty: 180 TABLET | Refills: 3 | Status: SHIPPED | OUTPATIENT
Start: 2024-06-04

## 2024-06-04 RX ORDER — METOPROLOL TARTRATE 25 MG/1
25 TABLET, FILM COATED ORAL 2 TIMES DAILY
Qty: 14 TABLET | Refills: 0 | Status: SHIPPED | OUTPATIENT
Start: 2024-06-04 | End: 2024-06-11

## 2024-06-04 NOTE — TELEPHONE ENCOUNTER
Patient needs a 90 day supply for Metoprolol and a week supply of this as well. I pended medications. Please advise once filled. Patient takes this twice a day.

## 2024-06-04 NOTE — PROGRESS NOTES
"Physical Therapy    Physical Therapy Treatment    Patient Name: lEsa Rojo  MRN: [id number]  Today's Date: 6/4/2024  Time Calculation  Start Time: 0906  Stop Time: 0946  Time Calculation (min): 40 min        Insurance:  Visit: #4  [place]  Certification: 5-11-24 to 8-2-24  Payor: MEDICARE / Plan: MEDICARE PART A AND B / Product Type: *No Product type* /     Subjective:  \"When I'm good about doing my exercises I do feel better. I do know that the pain I had previous when going to the gym or doing stairs has gone away. I feel better doing the stairs. I don't notice as much stiffness when I first get up.\"     Current pain: 0/10; Range: 0-2.5/10    Objective:  AROM: right hip: flex: 128    Treatment:  Sup right hip distraction Gr III x 30 sec x 5  Sup pelvic tilt x 10   Sup hook lying bent knee fall out x 10 sec x 10  Sup hip flexor stretch x 60 sec x 3  Sup hook lying hip ABD-ER x isometric x 10  Sup bridge x 10  S/L hip ABD-ER x 10     OP Education: HEP: #6    Charges: Manual x 1, TE x 2        Diagnosis:  1. Right hip pain    2. Hip flexor tightness, right          Assessment:   Pt demonstrates good progress with increased right hip flexion ROM and significant reduction in pain reports.  Progressing well towards all goals with partially meeting or meeting goals from evaluation. Pt is progressing towards discharge; tolerated there ext progression today without sx.     Goals:  Improve LEFS > 9 by 6 weeks - partially met  Improve AROM right hip flexion to 120 degrees to allow pt to tx from low seat by 6 weeks - met  Improve MMT bilateral hip ABD to 4/5 to allow reciprocal stair negotiation over 10 stairs by 6 weeks -partially met  Resume recreational exercise without restriction by 6 weeks - met    Plan:  Continue with focus on returning to baseline function. Move to self management over the next 1-2 visits.     Jose Juan Rosen, PT  "

## 2024-06-26 ENCOUNTER — APPOINTMENT (OUTPATIENT)
Dept: PRIMARY CARE | Facility: CLINIC | Age: 69
End: 2024-06-26
Payer: MEDICARE

## 2024-06-26 VITALS
TEMPERATURE: 97.5 F | BODY MASS INDEX: 23.71 KG/M2 | DIASTOLIC BLOOD PRESSURE: 84 MMHG | SYSTOLIC BLOOD PRESSURE: 133 MMHG | OXYGEN SATURATION: 97 % | WEIGHT: 151.4 LBS | HEART RATE: 59 BPM

## 2024-06-26 DIAGNOSIS — I65.21 MILD ATHEROSCLEROSIS OF RIGHT CAROTID ARTERY: ICD-10-CM

## 2024-06-26 DIAGNOSIS — F32.9 REACTIVE DEPRESSION: Primary | ICD-10-CM

## 2024-06-26 DIAGNOSIS — F33.9 DEPRESSION, RECURRENT (CMS-HCC): ICD-10-CM

## 2024-06-26 DIAGNOSIS — I10 BENIGN ESSENTIAL HYPERTENSION: ICD-10-CM

## 2024-06-26 DIAGNOSIS — F41.9 ANXIETY: ICD-10-CM

## 2024-06-26 DIAGNOSIS — I25.10 CORONARY ARTERY DISEASE INVOLVING NATIVE CORONARY ARTERY OF NATIVE HEART WITHOUT ANGINA PECTORIS: ICD-10-CM

## 2024-06-26 DIAGNOSIS — E04.1 THYROID NODULE: ICD-10-CM

## 2024-06-26 DIAGNOSIS — E78.49 OTHER HYPERLIPIDEMIA: ICD-10-CM

## 2024-06-26 PROBLEM — M70.61 TROCHANTERIC BURSITIS OF RIGHT HIP: Status: ACTIVE | Noted: 2024-03-15

## 2024-06-26 PROBLEM — M25.9 DISORDER OF HIP REGION: Status: ACTIVE | Noted: 2024-03-15

## 2024-06-26 PROCEDURE — 99214 OFFICE O/P EST MOD 30 MIN: CPT | Performed by: INTERNAL MEDICINE

## 2024-06-26 PROCEDURE — 1157F ADVNC CARE PLAN IN RCRD: CPT | Performed by: INTERNAL MEDICINE

## 2024-06-26 PROCEDURE — 3079F DIAST BP 80-89 MM HG: CPT | Performed by: INTERNAL MEDICINE

## 2024-06-26 PROCEDURE — 3075F SYST BP GE 130 - 139MM HG: CPT | Performed by: INTERNAL MEDICINE

## 2024-06-26 PROCEDURE — 1160F RVW MEDS BY RX/DR IN RCRD: CPT | Performed by: INTERNAL MEDICINE

## 2024-06-26 PROCEDURE — 1159F MED LIST DOCD IN RCRD: CPT | Performed by: INTERNAL MEDICINE

## 2024-06-26 RX ORDER — SERTRALINE HYDROCHLORIDE 50 MG/1
50 TABLET, FILM COATED ORAL DAILY
Qty: 30 TABLET | Refills: 1 | Status: SHIPPED | OUTPATIENT
Start: 2024-06-26 | End: 2024-08-25

## 2024-06-26 ASSESSMENT — PATIENT HEALTH QUESTIONNAIRE - PHQ9
5. POOR APPETITE OR OVEREATING: SEVERAL DAYS
SUM OF ALL RESPONSES TO PHQ9 QUESTIONS 1 AND 2: 4
1. LITTLE INTEREST OR PLEASURE IN DOING THINGS: MORE THAN HALF THE DAYS
2. FEELING DOWN, DEPRESSED OR HOPELESS: MORE THAN HALF THE DAYS
4. FEELING TIRED OR HAVING LITTLE ENERGY: MORE THAN HALF THE DAYS
7. TROUBLE CONCENTRATING ON THINGS, SUCH AS READING THE NEWSPAPER OR WATCHING TELEVISION: NOT AT ALL
6. FEELING BAD ABOUT YOURSELF - OR THAT YOU ARE A FAILURE OR HAVE LET YOURSELF OR YOUR FAMILY DOWN: NOT AT ALL
10. IF YOU CHECKED OFF ANY PROBLEMS, HOW DIFFICULT HAVE THESE PROBLEMS MADE IT FOR YOU TO DO YOUR WORK, TAKE CARE OF THINGS AT HOME, OR GET ALONG WITH OTHER PEOPLE: NOT DIFFICULT AT ALL
3. TROUBLE FALLING OR STAYING ASLEEP OR SLEEPING TOO MUCH: NOT AT ALL
8. MOVING OR SPEAKING SO SLOWLY THAT OTHER PEOPLE COULD HAVE NOTICED. OR THE OPPOSITE, BEING SO FIGETY OR RESTLESS THAT YOU HAVE BEEN MOVING AROUND A LOT MORE THAN USUAL: NOT AT ALL
SUM OF ALL RESPONSES TO PHQ QUESTIONS 1-9: 7
9. THOUGHTS THAT YOU WOULD BE BETTER OFF DEAD, OR OF HURTING YOURSELF: NOT AT ALL

## 2024-06-26 NOTE — PROGRESS NOTES
Hello okay Subjective   Patient ID: Elsa Rojo is a 69 y.o. female who presents for Follow-up.    Pt is here to discuss few issues and to follow up on  insomnia, her  was  diagnosed with metastatic esophageal cancer , unfortunately not responding to his treatment and is currently on hospice, patient has been feeling down and sad , she still pushed herself to go  exercise , she also has good support from her friend and from her [employer] member,she is currently on 50 mg of trazadone, she has been having some crying spell and feeling very sad as noted above.  She has been taking 5 mg of melatonin.  She has HTN, thyroid nodule and mild minimal plaque carotid deposit on the right side  Her pain in her right hip is better.    She is a former smoker quit in 2006, denies any cough or shortness of breath.  She is also breast cancer survivor was diagnosed in 2006, mammogram up-to-date             Review of Systems   Constitutional: Negative.    HENT: Negative.     Eyes: Negative.    Respiratory: Negative.     Cardiovascular: Negative.    Gastrointestinal: Negative.    Genitourinary: Negative.    Musculoskeletal:         As HPI.   Neurological: Negative.    Hematological: Negative.    Psychiatric/Behavioral:          As HPI       Objective   /84 (BP Location: Left arm, Patient Position: Sitting, BP Cuff Size: Adult)   Pulse 59   Temp 36.4 °C (97.5 °F) (Temporal)   Wt 68.7 kg (151 lb 6.4 oz)   SpO2 97%   BMI 23.71 kg/m²     Physical Exam  Constitutional:       Appearance: Normal appearance.   HENT:      Head: Normocephalic and atraumatic.   Eyes:      Extraocular Movements: Extraocular movements intact.      Pupils: Pupils are equal, round, and reactive to light.   Neck:      Vascular: No carotid bruit.   Cardiovascular:      Rate and Rhythm: Normal rate and regular rhythm.      Heart sounds: Normal heart sounds.   Pulmonary:      Effort: Pulmonary effort is normal.      Breath sounds: Normal breath sounds. No  wheezing or rhonchi.   Abdominal:      General: Abdomen is flat. Bowel sounds are normal. There is no distension.      Palpations: Abdomen is soft.   Musculoskeletal:         General: Normal range of motion.      Cervical back: Normal range of motion and neck supple.      Right lower leg: No edema.      Left lower leg: No edema.   Lymphadenopathy:      Cervical: No cervical adenopathy.   Skin:     General: Skin is warm.   Neurological:      General: No focal deficit present.      Mental Status: She is alert and oriented to person, place, and time.   Psychiatric:         Mood and Affect: Mood normal.         Behavior: Behavior normal.      Comments: Tearful at time.         Assessment/Plan   Problem List Items Addressed This Visit             ICD-10-CM    Hyperlipidemia E78.5     Stable on statin,continue low fat diet.         Anxiety F41.9    Benign essential hypertension I10     Stable on current medication.         CAD (coronary artery disease), native coronary artery I25.10    Mild atherosclerosis of carotid artery I65.29    Relevant Orders    Vascular US carotid artery duplex bilateral    Depression, recurrent (CMS-HCC) F33.9     Start Sertraline,continue Trazadone for insomnia.  Continue with Hospice care for her .  Follow up in 2 months.         Reactive depression - Primary F32.9    Relevant Medications    sertraline (Zoloft) 50 mg tablet    Thyroid nodule E04.1    Relevant Orders    US thyroid

## 2024-06-27 PROBLEM — E04.1 THYROID NODULE: Status: ACTIVE | Noted: 2024-06-27

## 2024-06-27 PROBLEM — F32.9 REACTIVE DEPRESSION: Status: ACTIVE | Noted: 2024-06-27

## 2024-06-27 ASSESSMENT — ENCOUNTER SYMPTOMS
CARDIOVASCULAR NEGATIVE: 1
NEUROLOGICAL NEGATIVE: 1
EYES NEGATIVE: 1
RESPIRATORY NEGATIVE: 1
CONSTITUTIONAL NEGATIVE: 1
HEMATOLOGIC/LYMPHATIC NEGATIVE: 1
GASTROINTESTINAL NEGATIVE: 1

## 2024-06-27 NOTE — ASSESSMENT & PLAN NOTE
Start Sertraline,continue Trazadone for insomnia.  Continue with Hospice care for her .  Follow up in 2 months.

## 2024-07-03 ENCOUNTER — TELEPHONE (OUTPATIENT)
Dept: PRIMARY CARE | Facility: CLINIC | Age: 69
End: 2024-07-03
Payer: MEDICARE

## 2024-07-05 ENCOUNTER — HOSPITAL ENCOUNTER (OUTPATIENT)
Dept: RADIOLOGY | Facility: CLINIC | Age: 69
End: 2024-07-05
Payer: MEDICARE

## 2024-07-09 ENCOUNTER — TELEPHONE (OUTPATIENT)
Dept: PRIMARY CARE | Facility: CLINIC | Age: 69
End: 2024-07-09
Payer: MEDICARE

## 2024-07-09 DIAGNOSIS — E78.5 HYPERLIPIDEMIA, UNSPECIFIED HYPERLIPIDEMIA TYPE: ICD-10-CM

## 2024-07-09 DIAGNOSIS — I10 BENIGN ESSENTIAL HYPERTENSION: Primary | ICD-10-CM

## 2024-07-09 DIAGNOSIS — I25.10 CORONARY ARTERY DISEASE INVOLVING NATIVE CORONARY ARTERY OF NATIVE HEART WITHOUT ANGINA PECTORIS: ICD-10-CM

## 2024-07-09 DIAGNOSIS — E78.49 OTHER HYPERLIPIDEMIA: ICD-10-CM

## 2024-07-09 RX ORDER — ATORVASTATIN CALCIUM 20 MG/1
20 TABLET, FILM COATED ORAL DAILY
Qty: 90 TABLET | Refills: 3 | Status: SHIPPED | OUTPATIENT
Start: 2024-07-09

## 2024-07-12 ENCOUNTER — LAB (OUTPATIENT)
Dept: LAB | Facility: LAB | Age: 69
End: 2024-07-12
Payer: MEDICARE

## 2024-07-12 ENCOUNTER — HOSPITAL ENCOUNTER (OUTPATIENT)
Dept: RADIOLOGY | Facility: CLINIC | Age: 69
Discharge: HOME | End: 2024-07-12
Payer: MEDICARE

## 2024-07-12 DIAGNOSIS — I10 BENIGN ESSENTIAL HYPERTENSION: ICD-10-CM

## 2024-07-12 DIAGNOSIS — E04.1 THYROID NODULE: ICD-10-CM

## 2024-07-12 DIAGNOSIS — E78.49 OTHER HYPERLIPIDEMIA: ICD-10-CM

## 2024-07-12 LAB
ALBUMIN SERPL BCP-MCNC: 4.4 G/DL (ref 3.4–5)
ALP SERPL-CCNC: 50 U/L (ref 33–136)
ALT SERPL W P-5'-P-CCNC: 21 U/L (ref 7–45)
ANION GAP SERPL CALC-SCNC: 12 MMOL/L (ref 10–20)
AST SERPL W P-5'-P-CCNC: 22 U/L (ref 9–39)
BILIRUB SERPL-MCNC: 1.5 MG/DL (ref 0–1.2)
BUN SERPL-MCNC: 19 MG/DL (ref 6–23)
CALCIUM SERPL-MCNC: 9.5 MG/DL (ref 8.6–10.6)
CHLORIDE SERPL-SCNC: 104 MMOL/L (ref 98–107)
CHOLEST SERPL-MCNC: 150 MG/DL (ref 0–199)
CHOLESTEROL/HDL RATIO: 2.4
CO2 SERPL-SCNC: 29 MMOL/L (ref 21–32)
CREAT SERPL-MCNC: 0.73 MG/DL (ref 0.5–1.05)
EGFRCR SERPLBLD CKD-EPI 2021: 89 ML/MIN/1.73M*2
ERYTHROCYTE [DISTWIDTH] IN BLOOD BY AUTOMATED COUNT: 11.9 % (ref 11.5–14.5)
GLUCOSE SERPL-MCNC: 100 MG/DL (ref 74–99)
HCT VFR BLD AUTO: 42 % (ref 36–46)
HDLC SERPL-MCNC: 63.3 MG/DL
HGB BLD-MCNC: 13.4 G/DL (ref 12–16)
LDLC SERPL CALC-MCNC: 72 MG/DL
MCH RBC QN AUTO: 31.4 PG (ref 26–34)
MCHC RBC AUTO-ENTMCNC: 31.9 G/DL (ref 32–36)
MCV RBC AUTO: 98 FL (ref 80–100)
NON HDL CHOLESTEROL: 87 MG/DL (ref 0–149)
NRBC BLD-RTO: 0 /100 WBCS (ref 0–0)
PLATELET # BLD AUTO: 238 X10*3/UL (ref 150–450)
POTASSIUM SERPL-SCNC: 4.3 MMOL/L (ref 3.5–5.3)
PROT SERPL-MCNC: 6.7 G/DL (ref 6.4–8.2)
RBC # BLD AUTO: 4.27 X10*6/UL (ref 4–5.2)
SODIUM SERPL-SCNC: 141 MMOL/L (ref 136–145)
TRIGL SERPL-MCNC: 72 MG/DL (ref 0–149)
TSH SERPL-ACNC: 1.56 MIU/L (ref 0.44–3.98)
VLDL: 14 MG/DL (ref 0–40)
WBC # BLD AUTO: 6.1 X10*3/UL (ref 4.4–11.3)

## 2024-07-12 PROCEDURE — 76536 US EXAM OF HEAD AND NECK: CPT

## 2024-07-12 PROCEDURE — 80061 LIPID PANEL: CPT

## 2024-07-12 PROCEDURE — 85027 COMPLETE CBC AUTOMATED: CPT

## 2024-07-12 PROCEDURE — 84443 ASSAY THYROID STIM HORMONE: CPT

## 2024-07-12 PROCEDURE — 36415 COLL VENOUS BLD VENIPUNCTURE: CPT

## 2024-07-12 PROCEDURE — 80053 COMPREHEN METABOLIC PANEL: CPT

## 2024-07-15 ENCOUNTER — TELEPHONE (OUTPATIENT)
Dept: PRIMARY CARE | Facility: CLINIC | Age: 69
End: 2024-07-15
Payer: MEDICARE

## 2024-07-15 DIAGNOSIS — G47.09 OTHER INSOMNIA: ICD-10-CM

## 2024-07-15 RX ORDER — TRAZODONE HYDROCHLORIDE 50 MG/1
100 TABLET ORAL NIGHTLY PRN
Start: 2024-07-15 | End: 2025-07-15

## 2024-07-15 NOTE — TELEPHONE ENCOUNTER
Pt called to get message to Dr. Esquivel    Taking trazodone 50 mg, waking up at 2 am. Could she take 2 of them?    Pt stated the Zoloft not working, stopped taking that.    Has a family situation going on.    Please call pt at [phone]

## 2024-07-15 NOTE — TELEPHONE ENCOUNTER
I spoke to pt,Sertraline not effective,she will take 2 Trazadone(total 100 mg at HS),follow up next week.

## 2024-07-22 ENCOUNTER — APPOINTMENT (OUTPATIENT)
Dept: PRIMARY CARE | Facility: CLINIC | Age: 69
End: 2024-07-22
Payer: MEDICARE

## 2024-07-22 VITALS
BODY MASS INDEX: 24.33 KG/M2 | HEART RATE: 57 BPM | HEIGHT: 66 IN | TEMPERATURE: 97.6 F | WEIGHT: 151.4 LBS | SYSTOLIC BLOOD PRESSURE: 128 MMHG | DIASTOLIC BLOOD PRESSURE: 79 MMHG | OXYGEN SATURATION: 97 %

## 2024-07-22 DIAGNOSIS — E78.49 OTHER HYPERLIPIDEMIA: ICD-10-CM

## 2024-07-22 DIAGNOSIS — K57.30 DIVERTICULOSIS OF COLON: ICD-10-CM

## 2024-07-22 DIAGNOSIS — K63.5 HYPERPLASTIC COLONIC POLYP, UNSPECIFIED PART OF COLON: ICD-10-CM

## 2024-07-22 DIAGNOSIS — Z00.00 MEDICARE ANNUAL WELLNESS VISIT, SUBSEQUENT: Primary | ICD-10-CM

## 2024-07-22 DIAGNOSIS — M85.80 OSTEOPENIA, UNSPECIFIED LOCATION: ICD-10-CM

## 2024-07-22 DIAGNOSIS — G47.09 OTHER INSOMNIA: ICD-10-CM

## 2024-07-22 DIAGNOSIS — I10 BENIGN ESSENTIAL HYPERTENSION: ICD-10-CM

## 2024-07-22 DIAGNOSIS — Z12.31 VISIT FOR SCREENING MAMMOGRAM: ICD-10-CM

## 2024-07-22 DIAGNOSIS — I25.10 CORONARY ARTERY DISEASE INVOLVING NATIVE CORONARY ARTERY OF NATIVE HEART WITHOUT ANGINA PECTORIS: ICD-10-CM

## 2024-07-22 DIAGNOSIS — R93.1 ELEVATED CORONARY ARTERY CALCIUM SCORE: ICD-10-CM

## 2024-07-22 DIAGNOSIS — F33.9 DEPRESSION, RECURRENT (CMS-HCC): ICD-10-CM

## 2024-07-22 DIAGNOSIS — H61.21 IMPACTED CERUMEN OF RIGHT EAR: ICD-10-CM

## 2024-07-22 DIAGNOSIS — Z85.3 HISTORY OF INVASIVE BREAST CANCER: ICD-10-CM

## 2024-07-22 DIAGNOSIS — Z00.00 ROUTINE GENERAL MEDICAL EXAMINATION AT HEALTH CARE FACILITY: ICD-10-CM

## 2024-07-22 DIAGNOSIS — F32.9 REACTIVE DEPRESSION: ICD-10-CM

## 2024-07-22 DIAGNOSIS — E04.2 MULTIPLE THYROID NODULES: ICD-10-CM

## 2024-07-22 PROCEDURE — 3074F SYST BP LT 130 MM HG: CPT | Performed by: INTERNAL MEDICINE

## 2024-07-22 PROCEDURE — 3078F DIAST BP <80 MM HG: CPT | Performed by: INTERNAL MEDICINE

## 2024-07-22 PROCEDURE — 1036F TOBACCO NON-USER: CPT | Performed by: INTERNAL MEDICINE

## 2024-07-22 PROCEDURE — 1158F ADVNC CARE PLAN TLK DOCD: CPT | Performed by: INTERNAL MEDICINE

## 2024-07-22 PROCEDURE — 1123F ACP DISCUSS/DSCN MKR DOCD: CPT | Performed by: INTERNAL MEDICINE

## 2024-07-22 PROCEDURE — G0439 PPPS, SUBSEQ VISIT: HCPCS | Performed by: INTERNAL MEDICINE

## 2024-07-22 PROCEDURE — 3008F BODY MASS INDEX DOCD: CPT | Performed by: INTERNAL MEDICINE

## 2024-07-22 PROCEDURE — 1160F RVW MEDS BY RX/DR IN RCRD: CPT | Performed by: INTERNAL MEDICINE

## 2024-07-22 PROCEDURE — 93000 ELECTROCARDIOGRAM COMPLETE: CPT | Performed by: INTERNAL MEDICINE

## 2024-07-22 PROCEDURE — 1159F MED LIST DOCD IN RCRD: CPT | Performed by: INTERNAL MEDICINE

## 2024-07-22 PROCEDURE — 1170F FXNL STATUS ASSESSED: CPT | Performed by: INTERNAL MEDICINE

## 2024-07-22 PROCEDURE — 99214 OFFICE O/P EST MOD 30 MIN: CPT | Performed by: INTERNAL MEDICINE

## 2024-07-22 PROCEDURE — 1157F ADVNC CARE PLAN IN RCRD: CPT | Performed by: INTERNAL MEDICINE

## 2024-07-22 RX ORDER — LORAZEPAM 1 MG/1
1 TABLET ORAL 2 TIMES DAILY PRN
Qty: 20 TABLET | Refills: 0 | Status: SHIPPED | OUTPATIENT
Start: 2024-07-22 | End: 2024-08-21

## 2024-07-22 RX ORDER — TRAZODONE HYDROCHLORIDE 100 MG/1
100 TABLET ORAL NIGHTLY PRN
Start: 2024-07-22 | End: 2025-07-22

## 2024-07-22 ASSESSMENT — ENCOUNTER SYMPTOMS
RESPIRATORY NEGATIVE: 1
CARDIOVASCULAR NEGATIVE: 1
CONSTITUTIONAL NEGATIVE: 1
PSYCHIATRIC NEGATIVE: 1
GASTROINTESTINAL NEGATIVE: 1
NEUROLOGICAL NEGATIVE: 1
HEMATOLOGIC/LYMPHATIC NEGATIVE: 1
EYES NEGATIVE: 1

## 2024-07-22 ASSESSMENT — ACTIVITIES OF DAILY LIVING (ADL)
DRESSING: INDEPENDENT
TAKING_MEDICATION: INDEPENDENT
BATHING: INDEPENDENT
MANAGING_FINANCES: INDEPENDENT
GROCERY_SHOPPING: INDEPENDENT
DOING_HOUSEWORK: INDEPENDENT

## 2024-07-22 NOTE — PROGRESS NOTES
"Subjective   Patient ID: Elsa Rojo is a 69 y.o. female who presents for No chief complaint on file..    This is 69-year-old patient is here for Medicare assessment and to follow-up .  She has been under stress due to her 's illness who has cancer and currently on hospice , she has good support from her friend and her children , she is currently on trazodone 100 mg at bedtime with good response .  she has HTN, thyroid nodule and mild minimal plaque carotid deposit on the right side  Her pain in her right hip is better.  Last colonoscopy 4/1/2021, next in 5 years which would be 26.  Last mammogram 12/14/2023.  Last DEXA 12/13/2021    She is a former smoker quit in 2006, denies any cough or shortness of breath.  She is also breast cancer survivor was diagnosed in 2006.           Review of Systems   Constitutional: Negative.    HENT: Negative.     Eyes: Negative.    Respiratory: Negative.     Cardiovascular: Negative.    Gastrointestinal: Negative.    Genitourinary: Negative.    Neurological: Negative.    Hematological: Negative.    Psychiatric/Behavioral: Negative.         Objective   /79 (BP Location: Right arm, Patient Position: Sitting, BP Cuff Size: Adult)   Pulse 57   Temp 36.4 °C (97.6 °F) (Temporal)   Ht 1.676 m (5' 6\")   Wt 68.7 kg (151 lb 6.4 oz)   SpO2 97%   BMI 24.44 kg/m²     Physical Exam  Vitals reviewed.   Constitutional:       General: She is not in acute distress.     Appearance: Normal appearance.   HENT:      Head: Normocephalic and atraumatic.      Right Ear: Tympanic membrane and ear canal normal.      Left Ear: Tympanic membrane, ear canal and external ear normal.      Ears:      Comments: Cerumen R ear.     Nose: Nose normal.      Mouth/Throat:      Mouth: Mucous membranes are moist.      Pharynx: No oropharyngeal exudate or posterior oropharyngeal erythema.   Eyes:      General: No scleral icterus.     Extraocular Movements: Extraocular movements intact.      Pupils: Pupils " are equal, round, and reactive to light.   Neck:      Vascular: No carotid bruit.   Cardiovascular:      Rate and Rhythm: Normal rate and regular rhythm.      Pulses: Normal pulses.      Heart sounds: Normal heart sounds. No murmur heard.  Pulmonary:      Effort: Pulmonary effort is normal.      Breath sounds: Normal breath sounds.   Abdominal:      General: Abdomen is flat. Bowel sounds are normal.      Palpations: Abdomen is soft.   Musculoskeletal:         General: Normal range of motion.      Cervical back: Normal range of motion and neck supple.   Lymphadenopathy:      Cervical: No cervical adenopathy.   Neurological:      General: No focal deficit present.      Mental Status: She is alert and oriented to person, place, and time.   Psychiatric:         Mood and Affect: Mood normal.      Comments: Tearful at time.         Assessment/Plan   Problem List Items Addressed This Visit             ICD-10-CM    Hyperlipidemia E78.5     Stable on statin,continue low fat diet.         Benign essential hypertension I10     Stable on current medication.         Relevant Orders    ECG 12 lead (Clinic Performed) (Completed)    CAD (coronary artery disease), native coronary artery I25.10     Elevated cardiac calcium score.  Denies any CP.         Elevated coronary artery calcium score R93.1     Denies any chest pain.         Hyperplastic colon polyp K63.5    Impacted cerumen of right ear H61.21    Relevant Orders    Referral to ENT    Multiple thyroid nodules E04.2     Thyroid ultrasound done recently TSH up-to-date.         Osteopenia M85.80     Continue ca,vit D and weight bearing exercise.  Due for DEXA next year         Depression, recurrent (CMS-HCC) F33.9     Continue trazodone she did not benefit nor responded to SSRI.         Visit for screening mammogram Z12.31    Relevant Orders    BI mammo bilateral screening tomosynthesis    Other insomnia G47.09     Continue trazodone.         Relevant Medications    traZODone  (Desyrel) 100 mg tablet    LORazepam (Ativan) 1 mg tablet    Diverticulosis of colon K57.30     Continue high-fiber diet.         History of invasive breast cancer Z85.3     Follow-up with breast surgeon.  Currently in remission.         Reactive depression F32.9    Relevant Medications    LORazepam (Ativan) 1 mg tablet    Medicare annual wellness visit, subsequent - Primary Z00.00     Other Visit Diagnoses         Codes    Routine general medical examination at [facility] facility     Z00.00

## 2024-07-25 ENCOUNTER — APPOINTMENT (OUTPATIENT)
Dept: VASCULAR MEDICINE | Facility: CLINIC | Age: 69
End: 2024-07-25
Payer: MEDICARE

## 2024-08-13 DIAGNOSIS — F32.9 REACTIVE DEPRESSION: ICD-10-CM

## 2024-08-13 DIAGNOSIS — G47.09 OTHER INSOMNIA: ICD-10-CM

## 2024-08-13 RX ORDER — LORAZEPAM 1 MG/1
1 TABLET ORAL 2 TIMES DAILY PRN
Qty: 20 TABLET | Refills: 0 | Status: SHIPPED | OUTPATIENT
Start: 2024-08-13 | End: 2024-08-23

## 2024-08-30 ENCOUNTER — TELEPHONE (OUTPATIENT)
Dept: PRIMARY CARE | Facility: CLINIC | Age: 69
End: 2024-08-30
Payer: MEDICARE

## 2024-08-30 DIAGNOSIS — F32.9 REACTIVE DEPRESSION: ICD-10-CM

## 2024-08-30 DIAGNOSIS — G47.09 OTHER INSOMNIA: ICD-10-CM

## 2024-08-30 RX ORDER — LORAZEPAM 1 MG/1
1 TABLET ORAL 2 TIMES DAILY PRN
Qty: 30 TABLET | Refills: 0 | Status: SHIPPED | OUTPATIENT
Start: 2024-08-30 | End: 2024-08-30 | Stop reason: SDUPTHER

## 2024-08-30 RX ORDER — LORAZEPAM 1 MG/1
1 TABLET ORAL 2 TIMES DAILY PRN
Qty: 30 TABLET | Refills: 0 | Status: SHIPPED | OUTPATIENT
Start: 2024-08-30 | End: 2024-09-14

## 2024-08-30 RX ORDER — LORAZEPAM 1 MG/1
1 TABLET ORAL 2 TIMES DAILY PRN
Qty: 20 TABLET | Refills: 0 | Status: CANCELLED | OUTPATIENT
Start: 2024-08-30 | End: 2024-09-09

## 2024-08-30 NOTE — TELEPHONE ENCOUNTER
I called patient, no answer, I left a message with my condolences regarding the loss of her  that I will send prescription for lorazepam(30 tablets).

## 2024-08-30 NOTE — TELEPHONE ENCOUNTER
Pt would like to know if she can get a refill on the Lorazepam. She is still having issues dealing with the death of her . Please send to [facility]  Call w/questions

## 2024-09-05 ENCOUNTER — TELEPHONE (OUTPATIENT)
Dept: PRIMARY CARE | Facility: CLINIC | Age: 69
End: 2024-09-05
Payer: MEDICARE

## 2024-09-05 DIAGNOSIS — G47.09 OTHER INSOMNIA: ICD-10-CM

## 2024-09-05 RX ORDER — TRAZODONE HYDROCHLORIDE 100 MG/1
100 TABLET ORAL NIGHTLY PRN
Qty: 90 TABLET | Refills: 3 | Status: SHIPPED | OUTPATIENT
Start: 2024-09-05 | End: 2025-09-05

## 2024-09-05 NOTE — TELEPHONE ENCOUNTER
I called patient, no answer, I left a message that I did renew her trazodone today and that I did send a 30 tablet for lorazepam on 8/30/2024 and to call me back and have me paged if she has any question.

## 2024-09-05 NOTE — TELEPHONE ENCOUNTER
Patient asking for a new prescription for Trazadone 100 mg 1 daily.    Patient asking for a call back from Dr. Esquivel regarding Lorazepam she has questions about discontinuing medication.     Advised patient this will be addressed tomorrow

## 2024-10-03 ENCOUNTER — HOSPITAL ENCOUNTER (OUTPATIENT)
Dept: VASCULAR MEDICINE | Facility: CLINIC | Age: 69
Discharge: HOME | End: 2024-10-03
Payer: MEDICARE

## 2024-10-03 DIAGNOSIS — G47.09 OTHER INSOMNIA: ICD-10-CM

## 2024-10-03 DIAGNOSIS — F32.9 REACTIVE DEPRESSION: ICD-10-CM

## 2024-10-03 DIAGNOSIS — I65.21 MILD ATHEROSCLEROSIS OF RIGHT CAROTID ARTERY: ICD-10-CM

## 2024-10-03 PROCEDURE — 93880 EXTRACRANIAL BILAT STUDY: CPT | Performed by: SURGERY

## 2024-10-03 PROCEDURE — 93880 EXTRACRANIAL BILAT STUDY: CPT

## 2024-10-03 RX ORDER — LORAZEPAM 1 MG/1
1 TABLET ORAL 2 TIMES DAILY PRN
Qty: 180 TABLET | Refills: 3 | OUTPATIENT
Start: 2024-10-03 | End: 2025-09-28

## 2024-10-03 NOTE — TELEPHONE ENCOUNTER
Did pt request refill on this Rx,she has an appt with me on 10/10,do you know if she is completely out?

## 2024-10-04 RX ORDER — LORAZEPAM 1 MG/1
1 TABLET ORAL 2 TIMES DAILY PRN
Qty: 20 TABLET | Refills: 0 | Status: SHIPPED | OUTPATIENT
Start: 2024-10-04 | End: 2024-10-19

## 2024-10-10 ENCOUNTER — APPOINTMENT (OUTPATIENT)
Dept: PRIMARY CARE | Facility: CLINIC | Age: 69
End: 2024-10-10
Payer: MEDICARE

## 2024-10-10 VITALS
HEART RATE: 69 BPM | WEIGHT: 144.6 LBS | BODY MASS INDEX: 23.34 KG/M2 | SYSTOLIC BLOOD PRESSURE: 138 MMHG | DIASTOLIC BLOOD PRESSURE: 80 MMHG | OXYGEN SATURATION: 98 % | TEMPERATURE: 98.2 F

## 2024-10-10 DIAGNOSIS — I25.10 CORONARY ARTERY DISEASE INVOLVING NATIVE CORONARY ARTERY OF NATIVE HEART WITHOUT ANGINA PECTORIS: ICD-10-CM

## 2024-10-10 DIAGNOSIS — R63.4 WEIGHT LOSS: ICD-10-CM

## 2024-10-10 DIAGNOSIS — E04.2 MULTIPLE THYROID NODULES: ICD-10-CM

## 2024-10-10 DIAGNOSIS — I10 BENIGN ESSENTIAL HYPERTENSION: ICD-10-CM

## 2024-10-10 DIAGNOSIS — G47.09 OTHER INSOMNIA: ICD-10-CM

## 2024-10-10 DIAGNOSIS — R39.9 UTI SYMPTOMS: ICD-10-CM

## 2024-10-10 DIAGNOSIS — E78.49 OTHER HYPERLIPIDEMIA: ICD-10-CM

## 2024-10-10 DIAGNOSIS — F33.9 DEPRESSION, RECURRENT (CMS-HCC): ICD-10-CM

## 2024-10-10 DIAGNOSIS — Z79.899 HIGH RISK MEDICATION USE: ICD-10-CM

## 2024-10-10 DIAGNOSIS — Z63.4 BEREAVEMENT: Primary | ICD-10-CM

## 2024-10-10 PROCEDURE — 1123F ACP DISCUSS/DSCN MKR DOCD: CPT | Performed by: INTERNAL MEDICINE

## 2024-10-10 PROCEDURE — 1158F ADVNC CARE PLAN TLK DOCD: CPT | Performed by: INTERNAL MEDICINE

## 2024-10-10 PROCEDURE — 3079F DIAST BP 80-89 MM HG: CPT | Performed by: INTERNAL MEDICINE

## 2024-10-10 PROCEDURE — 3075F SYST BP GE 130 - 139MM HG: CPT | Performed by: INTERNAL MEDICINE

## 2024-10-10 PROCEDURE — 1036F TOBACCO NON-USER: CPT | Performed by: INTERNAL MEDICINE

## 2024-10-10 PROCEDURE — 1160F RVW MEDS BY RX/DR IN RCRD: CPT | Performed by: INTERNAL MEDICINE

## 2024-10-10 PROCEDURE — 1157F ADVNC CARE PLAN IN RCRD: CPT | Performed by: INTERNAL MEDICINE

## 2024-10-10 PROCEDURE — 99214 OFFICE O/P EST MOD 30 MIN: CPT | Performed by: INTERNAL MEDICINE

## 2024-10-10 PROCEDURE — 1159F MED LIST DOCD IN RCRD: CPT | Performed by: INTERNAL MEDICINE

## 2024-10-10 SDOH — SOCIAL STABILITY - SOCIAL INSECURITY: DISSAPEARANCE AND DEATH OF FAMILY MEMBER: Z63.4

## 2024-10-10 ASSESSMENT — ENCOUNTER SYMPTOMS
CARDIOVASCULAR NEGATIVE: 1
GASTROINTESTINAL NEGATIVE: 1
EYES NEGATIVE: 1
RESPIRATORY NEGATIVE: 1

## 2024-10-10 NOTE — ASSESSMENT & PLAN NOTE
Advised patient if she had any other episodes when she feels nauseated or unwell should call 911 and go to ER for further evaluation and to rule out any cardiac event she can follow-up with cardiologist.

## 2024-10-10 NOTE — PROGRESS NOTES
"Subjective   Patient ID: Elsa Rojo is a 69 y.o. female who presents for Anxiety (Patient is here for anxiety from spouse's passing. ).    Pt is here to follow-up and has few concerns.  Her  recently  after a vu with cancer, she has good support from her friend and her children , she she has been grieving having close crying spells , she has been taking Ativan 1 tablet daily , she self stopped trazodone 100 mg because it was not effective, also she stopped SSRI in the past she want to grieve and cry\", she pushed herself to exercise and walk regularly.  She did have 1 episode of \"not feeling well \"after she walk she came home on a cold day ,she felt little sick in the stomach nauseated, resolved spontaneously denies any shortness of breath or dizziness, but was shortly after her  passed away, no recurrence of that episode.  She is known to have a elevated cardiac calcium score seen in past by cardiologist and had negative cardiac workup , she has been exercising regularly and denies exertional chest pain or shortness of breath.  She also noted over the last 6 months that whenever she need to urinate,\" she had a hard time getting her urine out and looks like her brain does not talk to her bladder\", she denies any dysuria or blood in her urine.  She has poor appetite and lost around 7 pounds since her 's death  she has HTN, thyroid nodule and mild minimal plaque carotid deposit on the right side  Last colonoscopy 2021, next in 5 years which would be 26.  Last mammogram 2023.  Last DEXA 2022  She is a former smoker quit in , denies any cough or shortness of breath.  She is also breast cancer survivor was diagnosed in .         Review of Systems   Constitutional:         As HPI   HENT: Negative.     Eyes: Negative.    Respiratory: Negative.     Cardiovascular: Negative.    Gastrointestinal: Negative.    Genitourinary:         As HPI       Objective   /80 (BP " Location: Left arm, Patient Position: Sitting)   Pulse 69   Temp 36.8 °C (98.2 °F) (Temporal)   Wt 65.6 kg (144 lb 9.6 oz)   SpO2 98%   BMI 23.34 kg/m²     Physical Exam  Constitutional:       Appearance: Normal appearance.   HENT:      Head: Normocephalic and atraumatic.   Eyes:      Extraocular Movements: Extraocular movements intact.      Pupils: Pupils are equal, round, and reactive to light.   Neck:      Vascular: No carotid bruit.   Cardiovascular:      Rate and Rhythm: Normal rate and regular rhythm.      Heart sounds: Normal heart sounds.   Pulmonary:      Effort: Pulmonary effort is normal.      Breath sounds: Normal breath sounds. No wheezing or rhonchi.   Abdominal:      General: Abdomen is flat. Bowel sounds are normal. There is no distension.      Palpations: Abdomen is soft.      Tenderness: There is no right CVA tenderness or left CVA tenderness.   Musculoskeletal:         General: Normal range of motion.      Cervical back: Normal range of motion and neck supple.      Right lower leg: No edema.      Left lower leg: No edema.   Lymphadenopathy:      Cervical: No cervical adenopathy.   Skin:     General: Skin is warm.   Neurological:      General: No focal deficit present.      Mental Status: She is alert and oriented to person, place, and time.   Psychiatric:         Mood and Affect: Mood normal.         Behavior: Behavior normal.      Comments: Tearful at time.         Assessment/Plan   Problem List Items Addressed This Visit             ICD-10-CM    Hyperlipidemia E78.5     Stable on statin,continue low fat diet.         Benign essential hypertension I10     Stable on current medication.         CAD (coronary artery disease), native coronary artery I25.10     Advised patient if she had any other episodes when she feels nauseated or unwell should call 911 and go to ER for further evaluation and to rule out any cardiac event she can follow-up with cardiologist.         Multiple thyroid nodules  E04.2    Depression, recurrent (CMS-HCC) F33.9     It is more grieving over the loss of her .  Continue Ativan CS agreement and urine drug screen.  Completed today.  OAR[place] report checked and verified, no red flags.         Other insomnia G47.09    Bereavement - Primary Z63.4    Weight loss R63.4     Due to grieving.  Advised patient to add some Ensure for extra nutrition.  Follow-up in 3 months we will recheck her weight         High risk medication use Z79.899    Relevant Orders    Drug Screen, Urine With Reflex to Confirmation    UTI symptoms R39.9    Relevant Orders    Urinalysis with Reflex Microscopic    Urine Culture

## 2024-10-10 NOTE — ASSESSMENT & PLAN NOTE
It is more grieving over the loss of her .  Continue Ativan CS agreement and urine drug screen.  Completed today.  OARRS report checked and verified, no red flags.

## 2024-10-10 NOTE — ASSESSMENT & PLAN NOTE
Due to grieving.  Advised patient to add some Ensure for extra nutrition.  Follow-up in 3 months we will recheck her weight

## 2024-10-11 ENCOUNTER — LAB (OUTPATIENT)
Dept: LAB | Facility: LAB | Age: 69
End: 2024-10-11
Payer: MEDICARE

## 2024-10-11 DIAGNOSIS — R39.9 UTI SYMPTOMS: ICD-10-CM

## 2024-10-11 DIAGNOSIS — Z79.899 HIGH RISK MEDICATION USE: ICD-10-CM

## 2024-10-11 LAB
AMPHETAMINES UR QL SCN: NORMAL
APPEARANCE UR: CLEAR
BARBITURATES UR QL SCN: NORMAL
BENZODIAZ UR QL SCN: NORMAL
BILIRUB UR STRIP.AUTO-MCNC: NEGATIVE MG/DL
BZE UR QL SCN: NORMAL
CANNABINOIDS UR QL SCN: NORMAL
COLOR UR: NORMAL
FENTANYL+NORFENTANYL UR QL SCN: NORMAL
GLUCOSE UR STRIP.AUTO-MCNC: NORMAL MG/DL
KETONES UR STRIP.AUTO-MCNC: NEGATIVE MG/DL
LEUKOCYTE ESTERASE UR QL STRIP.AUTO: NEGATIVE
METHADONE UR QL SCN: NORMAL
NITRITE UR QL STRIP.AUTO: NEGATIVE
OPIATES UR QL SCN: NORMAL
OXYCODONE+OXYMORPHONE UR QL SCN: NORMAL
PCP UR QL SCN: NORMAL
PH UR STRIP.AUTO: 5.5 [PH]
PROT UR STRIP.AUTO-MCNC: NEGATIVE MG/DL
RBC # UR STRIP.AUTO: NEGATIVE /UL
SP GR UR STRIP.AUTO: 1.01
UROBILINOGEN UR STRIP.AUTO-MCNC: NORMAL MG/DL

## 2024-10-11 PROCEDURE — 81003 URINALYSIS AUTO W/O SCOPE: CPT

## 2024-10-11 PROCEDURE — 80307 DRUG TEST PRSMV CHEM ANLYZR: CPT

## 2024-10-11 PROCEDURE — 87086 URINE CULTURE/COLONY COUNT: CPT

## 2024-10-12 LAB — BACTERIA UR CULT: NORMAL

## 2024-10-19 ENCOUNTER — PATIENT MESSAGE (OUTPATIENT)
Dept: PRIMARY CARE | Facility: CLINIC | Age: 69
End: 2024-10-19
Payer: MEDICARE

## 2024-10-19 DIAGNOSIS — F41.8 DEPRESSION WITH ANXIETY: Primary | ICD-10-CM

## 2024-10-21 ENCOUNTER — TELEPHONE (OUTPATIENT)
Dept: PRIMARY CARE | Facility: CLINIC | Age: 69
End: 2024-10-21
Payer: MEDICARE

## 2024-10-21 DIAGNOSIS — F32.9 REACTIVE DEPRESSION: ICD-10-CM

## 2024-10-21 DIAGNOSIS — F41.8 DEPRESSION WITH ANXIETY: ICD-10-CM

## 2024-10-21 DIAGNOSIS — G47.09 OTHER INSOMNIA: ICD-10-CM

## 2024-10-21 RX ORDER — LORAZEPAM 1 MG/1
1 TABLET ORAL 2 TIMES DAILY PRN
Qty: 40 TABLET | Refills: 0 | Status: SHIPPED | OUTPATIENT
Start: 2024-10-21 | End: 2024-11-20

## 2024-10-21 RX ORDER — ESCITALOPRAM OXALATE 10 MG/1
10 TABLET ORAL DAILY
Qty: 30 TABLET | Refills: 1 | Status: SHIPPED | OUTPATIENT
Start: 2024-10-21 | End: 2024-10-25 | Stop reason: SDUPTHER

## 2024-10-21 RX ORDER — ESCITALOPRAM OXALATE 10 MG/1
10 TABLET ORAL DAILY
Qty: 30 TABLET | Refills: 0 | Status: SHIPPED | OUTPATIENT
Start: 2024-10-21 | End: 2024-10-21 | Stop reason: SDUPTHER

## 2024-10-21 NOTE — PATIENT COMMUNICATION
Please advise. Pt called asking about this stating she has workers coming to her home but should be able to work around it. Thank you!

## 2024-10-21 NOTE — TELEPHONE ENCOUNTER
Pt unable to come in today at 4:45pm.     Is there somewhere else she can come in?  Pt also asking about Lorazepam. She is completely out of medication.    Pharmacy= [facility]    Please call pt at [phone]

## 2024-10-25 ENCOUNTER — OFFICE VISIT (OUTPATIENT)
Dept: PRIMARY CARE | Facility: CLINIC | Age: 69
End: 2024-10-25
Payer: MEDICARE

## 2024-10-25 ENCOUNTER — HOSPITAL ENCOUNTER (OUTPATIENT)
Dept: RADIOLOGY | Facility: CLINIC | Age: 69
Discharge: HOME | End: 2024-10-25
Payer: MEDICARE

## 2024-10-25 VITALS
SYSTOLIC BLOOD PRESSURE: 119 MMHG | BODY MASS INDEX: 23.15 KG/M2 | DIASTOLIC BLOOD PRESSURE: 69 MMHG | OXYGEN SATURATION: 98 % | HEART RATE: 58 BPM | TEMPERATURE: 98.2 F | WEIGHT: 143.4 LBS

## 2024-10-25 DIAGNOSIS — R22.42 FOOT MASS, LEFT: ICD-10-CM

## 2024-10-25 DIAGNOSIS — F41.8 DEPRESSION WITH ANXIETY: ICD-10-CM

## 2024-10-25 DIAGNOSIS — G47.09 OTHER INSOMNIA: ICD-10-CM

## 2024-10-25 DIAGNOSIS — I10 BENIGN ESSENTIAL HYPERTENSION: ICD-10-CM

## 2024-10-25 DIAGNOSIS — M85.80 OSTEOPENIA, UNSPECIFIED LOCATION: ICD-10-CM

## 2024-10-25 DIAGNOSIS — F33.9 DEPRESSION, RECURRENT (CMS-HCC): Primary | ICD-10-CM

## 2024-10-25 DIAGNOSIS — R22.42 MASS OF LEFT FOOT: ICD-10-CM

## 2024-10-25 DIAGNOSIS — R31.29 MICROSCOPIC HEMATURIA: ICD-10-CM

## 2024-10-25 DIAGNOSIS — E78.49 OTHER HYPERLIPIDEMIA: ICD-10-CM

## 2024-10-25 DIAGNOSIS — I25.10 CORONARY ARTERY DISEASE INVOLVING NATIVE CORONARY ARTERY OF NATIVE HEART WITHOUT ANGINA PECTORIS: ICD-10-CM

## 2024-10-25 DIAGNOSIS — Z63.4 BEREAVEMENT: ICD-10-CM

## 2024-10-25 PROBLEM — I65.22 STENOSIS OF LEFT CAROTID ARTERY: Status: ACTIVE | Noted: 2024-10-25

## 2024-10-25 PROCEDURE — 73630 X-RAY EXAM OF FOOT: CPT | Mod: LT

## 2024-10-25 PROCEDURE — 1159F MED LIST DOCD IN RCRD: CPT | Performed by: INTERNAL MEDICINE

## 2024-10-25 PROCEDURE — 1157F ADVNC CARE PLAN IN RCRD: CPT | Performed by: INTERNAL MEDICINE

## 2024-10-25 PROCEDURE — 1123F ACP DISCUSS/DSCN MKR DOCD: CPT | Performed by: INTERNAL MEDICINE

## 2024-10-25 PROCEDURE — 3074F SYST BP LT 130 MM HG: CPT | Performed by: INTERNAL MEDICINE

## 2024-10-25 PROCEDURE — G2211 COMPLEX E/M VISIT ADD ON: HCPCS | Performed by: INTERNAL MEDICINE

## 2024-10-25 PROCEDURE — 3078F DIAST BP <80 MM HG: CPT | Performed by: INTERNAL MEDICINE

## 2024-10-25 PROCEDURE — 1160F RVW MEDS BY RX/DR IN RCRD: CPT | Performed by: INTERNAL MEDICINE

## 2024-10-25 PROCEDURE — 99214 OFFICE O/P EST MOD 30 MIN: CPT | Performed by: INTERNAL MEDICINE

## 2024-10-25 PROCEDURE — 1036F TOBACCO NON-USER: CPT | Performed by: INTERNAL MEDICINE

## 2024-10-25 RX ORDER — ESCITALOPRAM OXALATE 10 MG/1
10 TABLET ORAL DAILY
Qty: 90 TABLET | Refills: 3 | Status: SHIPPED | OUTPATIENT
Start: 2024-10-25 | End: 2025-10-20

## 2024-10-25 SDOH — SOCIAL STABILITY - SOCIAL INSECURITY: DISSAPEARANCE AND DEATH OF FAMILY MEMBER: Z63.4

## 2024-10-25 ASSESSMENT — PATIENT HEALTH QUESTIONNAIRE - PHQ9
4. FEELING TIRED OR HAVING LITTLE ENERGY: SEVERAL DAYS
6. FEELING BAD ABOUT YOURSELF - OR THAT YOU ARE A FAILURE OR HAVE LET YOURSELF OR YOUR FAMILY DOWN: NOT AT ALL
10. IF YOU CHECKED OFF ANY PROBLEMS, HOW DIFFICULT HAVE THESE PROBLEMS MADE IT FOR YOU TO DO YOUR WORK, TAKE CARE OF THINGS AT HOME, OR GET ALONG WITH OTHER PEOPLE: NOT DIFFICULT AT ALL
9. THOUGHTS THAT YOU WOULD BE BETTER OFF DEAD, OR OF HURTING YOURSELF: SEVERAL DAYS
1. LITTLE INTEREST OR PLEASURE IN DOING THINGS: SEVERAL DAYS
5. POOR APPETITE OR OVEREATING: SEVERAL DAYS
SUM OF ALL RESPONSES TO PHQ QUESTIONS 1-9: 13
3. TROUBLE FALLING OR STAYING ASLEEP OR SLEEPING TOO MUCH: NEARLY EVERY DAY
SUM OF ALL RESPONSES TO PHQ9 QUESTIONS 1 AND 2: 4
8. MOVING OR SPEAKING SO SLOWLY THAT OTHER PEOPLE COULD HAVE NOTICED. OR THE OPPOSITE, BEING SO FIGETY OR RESTLESS THAT YOU HAVE BEEN MOVING AROUND A LOT MORE THAN USUAL: NOT AT ALL
7. TROUBLE CONCENTRATING ON THINGS, SUCH AS READING THE NEWSPAPER OR WATCHING TELEVISION: NEARLY EVERY DAY
2. FEELING DOWN, DEPRESSED OR HOPELESS: NEARLY EVERY DAY

## 2024-10-25 ASSESSMENT — ENCOUNTER SYMPTOMS
CARDIOVASCULAR NEGATIVE: 1
RESPIRATORY NEGATIVE: 1

## 2024-10-25 NOTE — ASSESSMENT & PLAN NOTE
PHQ9 done today,score 13  Continue Lexapro.  Continue Lorazepam,can be habit forming,advised to take infrequently,lately needed 1-2 times daily.  Follow up in 2 months,continue counseling and grief support group meeting.    Pt called on 11/14/2024,Trazadone not helping her insomnia,still depressed,I will refer her to [facility] to speed up process and treatment plan.

## 2024-10-25 NOTE — ASSESSMENT & PLAN NOTE
Urinalysis was normal,advised to see uroGyn for further evaluation of her urinary symptoms,she wants to wait for now,will increase oral water intake and treat her anxiety and will call for referral if symptoms persist.

## 2024-10-28 ENCOUNTER — OFFICE VISIT (OUTPATIENT)
Dept: URGENT CARE | Age: 69
End: 2024-10-28
Payer: MEDICARE

## 2024-10-28 VITALS
DIASTOLIC BLOOD PRESSURE: 72 MMHG | SYSTOLIC BLOOD PRESSURE: 118 MMHG | BODY MASS INDEX: 21.97 KG/M2 | RESPIRATION RATE: 16 BRPM | OXYGEN SATURATION: 99 % | HEIGHT: 67 IN | WEIGHT: 140 LBS | HEART RATE: 48 BPM | TEMPERATURE: 98.3 F

## 2024-10-28 DIAGNOSIS — R11.0 NAUSEA: ICD-10-CM

## 2024-10-28 DIAGNOSIS — R42 DIZZINESS: Primary | ICD-10-CM

## 2024-10-28 PROCEDURE — 3078F DIAST BP <80 MM HG: CPT | Performed by: FAMILY MEDICINE

## 2024-10-28 PROCEDURE — 93000 ELECTROCARDIOGRAM COMPLETE: CPT | Performed by: FAMILY MEDICINE

## 2024-10-28 PROCEDURE — 1123F ACP DISCUSS/DSCN MKR DOCD: CPT | Performed by: FAMILY MEDICINE

## 2024-10-28 PROCEDURE — 1157F ADVNC CARE PLAN IN RCRD: CPT | Performed by: FAMILY MEDICINE

## 2024-10-28 PROCEDURE — 3008F BODY MASS INDEX DOCD: CPT | Performed by: FAMILY MEDICINE

## 2024-10-28 PROCEDURE — 99204 OFFICE O/P NEW MOD 45 MIN: CPT | Performed by: FAMILY MEDICINE

## 2024-10-28 PROCEDURE — 1126F AMNT PAIN NOTED NONE PRSNT: CPT | Performed by: FAMILY MEDICINE

## 2024-10-28 PROCEDURE — 3074F SYST BP LT 130 MM HG: CPT | Performed by: FAMILY MEDICINE

## 2024-10-28 PROCEDURE — 1159F MED LIST DOCD IN RCRD: CPT | Performed by: FAMILY MEDICINE

## 2024-10-28 PROCEDURE — 1036F TOBACCO NON-USER: CPT | Performed by: FAMILY MEDICINE

## 2024-10-28 ASSESSMENT — PAIN SCALES - GENERAL: PAINLEVEL_OUTOF10: 0-NO PAIN

## 2024-11-12 ENCOUNTER — TELEPHONE (OUTPATIENT)
Dept: PRIMARY CARE | Facility: CLINIC | Age: 69
End: 2024-11-12
Payer: MEDICARE

## 2024-11-12 DIAGNOSIS — R63.4 WEIGHT LOSS: ICD-10-CM

## 2024-11-12 DIAGNOSIS — F33.9 DEPRESSION, RECURRENT (CMS-HCC): Primary | ICD-10-CM

## 2024-11-12 DIAGNOSIS — G47.09 OTHER INSOMNIA: ICD-10-CM

## 2024-11-12 DIAGNOSIS — F32.9 REACTIVE DEPRESSION: ICD-10-CM

## 2024-11-12 DIAGNOSIS — Z63.4 BEREAVEMENT: ICD-10-CM

## 2024-11-12 RX ORDER — ESCITALOPRAM OXALATE 20 MG/1
20 TABLET ORAL DAILY
Qty: 90 TABLET | Refills: 3 | Status: SHIPPED | OUTPATIENT
Start: 2024-11-12 | End: 2025-11-12

## 2024-11-12 RX ORDER — LORAZEPAM 1 MG/1
1 TABLET ORAL 2 TIMES DAILY PRN
Qty: 20 TABLET | Refills: 0 | Status: SHIPPED | OUTPATIENT
Start: 2024-11-12 | End: 2024-11-22

## 2024-11-12 SDOH — SOCIAL STABILITY - SOCIAL INSECURITY: DISSAPEARANCE AND DEATH OF FAMILY MEMBER: Z63.4

## 2024-11-12 NOTE — TELEPHONE ENCOUNTER
I called pt,was requesting refill on Ativan,still very depressed,I did increase Lexapro up to 20 mg daily and referred her to a psychiatrist for medication management,sent 20 tablets Ativan(warned can be habit forming).

## 2024-11-20 ENCOUNTER — APPOINTMENT (OUTPATIENT)
Dept: CARDIOLOGY | Facility: CLINIC | Age: 69
End: 2024-11-20
Payer: MEDICARE

## 2024-11-20 VITALS
SYSTOLIC BLOOD PRESSURE: 126 MMHG | DIASTOLIC BLOOD PRESSURE: 80 MMHG | HEART RATE: 59 BPM | BODY MASS INDEX: 21.66 KG/M2 | WEIGHT: 138 LBS | HEIGHT: 67 IN

## 2024-11-20 DIAGNOSIS — R42 DIZZINESS: ICD-10-CM

## 2024-11-20 DIAGNOSIS — I25.10 CORONARY ARTERY CALCIFICATION: ICD-10-CM

## 2024-11-20 DIAGNOSIS — E78.49 OTHER HYPERLIPIDEMIA: Primary | ICD-10-CM

## 2024-11-20 DIAGNOSIS — Z63.4 BEREAVEMENT: ICD-10-CM

## 2024-11-20 DIAGNOSIS — I10 BENIGN ESSENTIAL HYPERTENSION: ICD-10-CM

## 2024-11-20 PROCEDURE — 1036F TOBACCO NON-USER: CPT | Performed by: INTERNAL MEDICINE

## 2024-11-20 PROCEDURE — 3008F BODY MASS INDEX DOCD: CPT | Performed by: INTERNAL MEDICINE

## 2024-11-20 PROCEDURE — 1159F MED LIST DOCD IN RCRD: CPT | Performed by: INTERNAL MEDICINE

## 2024-11-20 PROCEDURE — 1157F ADVNC CARE PLAN IN RCRD: CPT | Performed by: INTERNAL MEDICINE

## 2024-11-20 PROCEDURE — 93000 ELECTROCARDIOGRAM COMPLETE: CPT | Performed by: INTERNAL MEDICINE

## 2024-11-20 PROCEDURE — 3074F SYST BP LT 130 MM HG: CPT | Performed by: INTERNAL MEDICINE

## 2024-11-20 PROCEDURE — 1123F ACP DISCUSS/DSCN MKR DOCD: CPT | Performed by: INTERNAL MEDICINE

## 2024-11-20 PROCEDURE — G2211 COMPLEX E/M VISIT ADD ON: HCPCS | Performed by: INTERNAL MEDICINE

## 2024-11-20 PROCEDURE — 3079F DIAST BP 80-89 MM HG: CPT | Performed by: INTERNAL MEDICINE

## 2024-11-20 PROCEDURE — 1160F RVW MEDS BY RX/DR IN RCRD: CPT | Performed by: INTERNAL MEDICINE

## 2024-11-20 PROCEDURE — 99214 OFFICE O/P EST MOD 30 MIN: CPT | Performed by: INTERNAL MEDICINE

## 2024-11-20 SDOH — SOCIAL STABILITY - SOCIAL INSECURITY: DISSAPEARANCE AND DEATH OF FAMILY MEMBER: Z63.4

## 2024-11-20 NOTE — ASSESSMENT & PLAN NOTE
This is likely due to, in part, to medication metoprolol, and in part due to intravascular volume depletion and very poor appetite.  Her poor appetite is likely a consequence of her bereavement.

## 2024-11-20 NOTE — PROGRESS NOTES
"Referred by Dr. Esquivel for Please see below.     History Of Present Illness:    Elsa Rojo is a 69 y.o. female presenting with hypertension, hyperlipidemia, coronary artery calcification.    This 69-year-old hypertensive, hyperlipidemic woman with an elevated coronary artery calcium score and a history of left thoracic radiation therapy for breast cancer in 2006 returns to the office in follow-up. Her prior perfusion scan in May 2017 was negative for ischemia.     Her  passed away of esophageal CA in August 2024, and she is grieving and having a difficult time.      In September 2024, she experienced 2 episodes of dizziness that occurred after she had gone for a walk.  The dizziness did not occur during her walk or during exertion.  The symptoms passed relatively rapidly and have not occurred since then.  The patient denies chest discomfort, dyspnea, palpitations, orthopnea, PND, syncope, and near syncope.  She logs 12,000 steps per day.  She walks five days a week, for 45 minutes, going 2 miles.   She drinks only about 24 ounces of noncaffeinated, nonalcoholic beverage per day.  She has 1 coffee per day, and rarely drinks anything else.  Her blood pressures at home have been well-controlled.    She has lost 17 lbs since August 2022.  She states, \"I don't have an appetite; I only eat if someeone eats with me\".      Past Medical History:  She has a past medical history of Cellulitis, unspecified (12/04/2013), Other conditions influencing health status, Other conditions influencing health status, Other conditions influencing health status, Other conditions influencing health status (10/29/2013), Other microscopic hematuria, Personal history of colonic polyps, and Personal history of other diseases of the digestive system (10/13/2014).    Past Surgical History:  She has a past surgical history that includes Colonoscopy (12/31/2012); Breast lumpectomy (Left, 01/01/2006); Other surgical history (12/31/2012); " "Other surgical history (12/31/2012); Other surgical history (01/11/2014); and Lymphadenectomy.      Social History:  She reports that she quit smoking about 18 years ago. Her smoking use included cigarettes. She has never used smokeless tobacco. She reports current alcohol use. She reports that she does not currently use drugs after having used the following drugs: Marijuana.    Family History:  Family History   Problem Relation Name Age of Onset    Motor neuron disease Mother      Lung cancer Father      Lymphoma Sister          Allergies:  Cephalexin, Bacitracin zinc-polymyxin b, Bupropion, Penicillins, Bacitracin, and Neomycin    Outpatient Medications:  Current Outpatient Medications   Medication Instructions    aspirin 81 mg, Daily    atorvastatin (LIPITOR) 20 mg, oral, Daily    betamethasone, augmented, (Diprolene, augmented,) 0.05 % ointment Topical, 2 times daily    CALCIUM ORAL 1 tablet, Daily    cholecalciferol (VITAMIN D-3) 100 mcg, Daily    escitalopram (LEXAPRO) 20 mg, oral, Daily    HAIR, SKIN AND NAILS, BIOTIN, ORAL 1 capsule, Daily    LORazepam (ATIVAN) 1 mg, oral, 2 times daily PRN    metoprolol tartrate (LOPRESSOR) 25 mg, oral, 2 times daily        Last Recorded Vitals:  Vitals:    11/20/24 1300   BP: 126/80   BP Location: Right arm   Patient Position: Sitting   Pulse: 59   Weight: 62.6 kg (138 lb)   Height: 1.702 m (5' 7\")       Physical Exam:  GENERAL:  pleasant 69 year-old  HEENT: No xanthelasma  NECK: Supple, no palpable adenopathy or thyromegaly  CHEST: Clear to auscultation, respiratory effort unlabored  CARDIAC: RRR, normal S1 and S2, no audible murmur, rub, gallop, carotids are brisk, PMI is not displaced  ABD: Active bowel sounds, nontender, no organomegaly, no evidence of ascites  EXT: No clubbing, cyanosis, edema, or tenderness  NEURO: Awake, alert, appropriate, speech is fluent         Last Labs:  CBC -  Lab Results   Component Value Date    WBC 6.1 07/12/2024    HGB 13.4 07/12/2024    " "HCT 42.0 07/12/2024    MCV 98 07/12/2024     07/12/2024       CMP -  Lab Results   Component Value Date    CALCIUM 9.5 07/12/2024    PROT 6.7 07/12/2024    ALBUMIN 4.4 07/12/2024    AST 22 07/12/2024    ALT 21 07/12/2024    ALKPHOS 50 07/12/2024    BILITOT 1.5 (H) 07/12/2024       LIPID PANEL -   Lab Results   Component Value Date    CHOL 150 07/12/2024    TRIG 72 07/12/2024    HDL 63.3 07/12/2024    CHHDL 2.4 07/12/2024    LDLF 78 07/25/2023    VLDL 14 07/12/2024    NHDL 87 07/12/2024       RENAL FUNCTION PANEL -   Lab Results   Component Value Date    GLUCOSE 100 (H) 07/12/2024     07/12/2024    K 4.3 07/12/2024     07/12/2024    CO2 29 07/12/2024    ANIONGAP 12 07/12/2024    BUN 19 07/12/2024    CREATININE 0.73 07/12/2024    CALCIUM 9.5 07/12/2024    ALBUMIN 4.4 07/12/2024        No results found for: \"BNP\", \"HGBA1C\"      Lab review: I have Chemistry CMP:   Lab Results   Component Value Date    ALBUMIN 4.4 07/12/2024    CALCIUM 9.5 07/12/2024    CO2 29 07/12/2024    CREATININE 0.73 07/12/2024    GLUCOSE 100 (H) 07/12/2024    BILITOT 1.5 (H) 07/12/2024    PROT 6.7 07/12/2024    ALT 21 07/12/2024    AST 22 07/12/2024    ALKPHOS 50 07/12/2024   , Chemistry BMP   Lab Results   Component Value Date    GLUCOSE 100 (H) 07/12/2024    CALCIUM 9.5 07/12/2024    CO2 29 07/12/2024    CREATININE 0.73 07/12/2024   , CBC:  Lab Results   Component Value Date    WBC 6.1 07/12/2024    RBC 4.27 07/12/2024    HGB 13.4 07/12/2024    HCT 42.0 07/12/2024    MCV 98 07/12/2024    MCH 31.4 07/12/2024    MCHC 31.9 (L) 07/12/2024    RDW 11.9 07/12/2024    NRBC 0.0 07/12/2024   , and Lipids:   Lab Results   Component Value Date    CHOL 150 07/12/2024    HDL 63.3 07/12/2024    LDLCALC 72 07/12/2024    TRIG 72 07/12/2024       Assessment/Plan   Assessment & Plan  Benign essential hypertension  HTN:  BP is well controlled.   We discussed sodium restriction, lifestyle modification, and the DASH diet.  I advised the patient to " check BPs at home daily, and to contact me with an update in one month.    Orders:    ECG 12 lead (Clinic Performed)    Follow Up In Cardiology; Future    Other hyperlipidemia   The patient's most recent lipid profile reflects that lipid endpoints are at target.  Orders:    Follow Up In Cardiology; Future    Coronary artery calcification  No additional cardiac testing necessary at present.  Orders:    Follow Up In Cardiology; Future    Bereavement  See below.  Her blood pressure being well-controlled, and with the difficult emotional struggle she is going through, will wean and discontinue metoprolol which may actually be aggravating some of her problems.       Dizziness  This is likely due to, in part, to medication metoprolol, and in part due to intravascular volume depletion and very poor appetite.  Her poor appetite is likely a consequence of her bereavement.               Ladarius Emanuel MD

## 2024-11-20 NOTE — ASSESSMENT & PLAN NOTE
See below.  Her blood pressure being well-controlled, and with the difficult emotional struggle she is going through, will wean and discontinue metoprolol which may actually be aggravating some of her problems.

## 2024-11-20 NOTE — ASSESSMENT & PLAN NOTE
HTN:  BP is well controlled.   We discussed sodium restriction, lifestyle modification, and the DASH diet.  I advised the patient to check BPs at home daily, and to contact me with an update in one month.    Orders:    ECG 12 lead (Clinic Performed)    Follow Up In Cardiology; Future

## 2024-11-20 NOTE — PATIENT INSTRUCTIONS

## 2024-11-20 NOTE — ASSESSMENT & PLAN NOTE
The patient's most recent lipid profile reflects that lipid endpoints are at target.  Orders:    Follow Up In Cardiology; Future

## 2024-12-17 NOTE — PROGRESS NOTES
Subjective   Patient ID: Elsa Rojo is a 69 y.o. female who presents for No chief complaint on file..  Saw cards, said heart is fine. Stopped metoprolol.  Exercising, gym daily. Machines and walking.    due to cancer.         Review of Systems    Objective   Physical Exam  Constitutional:       Appearance: Normal appearance. She is normal weight.   Pulmonary:      Effort: Pulmonary effort is normal.   Genitourinary:     General: Normal vulva.      Comments: No erythema, no white patches.  Neurological:      Mental Status: She is alert.         Assessment/Plan   Problem List Items Addressed This Visit             ICD-10-CM       Ob-Gyn Problems    Lichen sclerosus of vulva - Primary N90.4            Ruby Alcocer MD 24 12:59 AM

## 2024-12-18 ENCOUNTER — APPOINTMENT (OUTPATIENT)
Dept: OBSTETRICS AND GYNECOLOGY | Facility: CLINIC | Age: 69
End: 2024-12-18
Payer: MEDICARE

## 2024-12-18 VITALS
WEIGHT: 137 LBS | HEIGHT: 67 IN | DIASTOLIC BLOOD PRESSURE: 76 MMHG | BODY MASS INDEX: 21.5 KG/M2 | SYSTOLIC BLOOD PRESSURE: 118 MMHG

## 2024-12-18 DIAGNOSIS — N90.4 LICHEN SCLEROSUS OF VULVA: Primary | ICD-10-CM

## 2024-12-18 PROCEDURE — 3078F DIAST BP <80 MM HG: CPT | Performed by: OBSTETRICS & GYNECOLOGY

## 2024-12-18 PROCEDURE — 3008F BODY MASS INDEX DOCD: CPT | Performed by: OBSTETRICS & GYNECOLOGY

## 2024-12-18 PROCEDURE — 1123F ACP DISCUSS/DSCN MKR DOCD: CPT | Performed by: OBSTETRICS & GYNECOLOGY

## 2024-12-18 PROCEDURE — 1157F ADVNC CARE PLAN IN RCRD: CPT | Performed by: OBSTETRICS & GYNECOLOGY

## 2024-12-18 PROCEDURE — 1160F RVW MEDS BY RX/DR IN RCRD: CPT | Performed by: OBSTETRICS & GYNECOLOGY

## 2024-12-18 PROCEDURE — 3074F SYST BP LT 130 MM HG: CPT | Performed by: OBSTETRICS & GYNECOLOGY

## 2024-12-18 PROCEDURE — 1159F MED LIST DOCD IN RCRD: CPT | Performed by: OBSTETRICS & GYNECOLOGY

## 2024-12-18 PROCEDURE — 1036F TOBACCO NON-USER: CPT | Performed by: OBSTETRICS & GYNECOLOGY

## 2024-12-18 PROCEDURE — 1126F AMNT PAIN NOTED NONE PRSNT: CPT | Performed by: OBSTETRICS & GYNECOLOGY

## 2024-12-18 PROCEDURE — 99212 OFFICE O/P EST SF 10 MIN: CPT | Performed by: OBSTETRICS & GYNECOLOGY

## 2024-12-18 ASSESSMENT — PAIN SCALES - GENERAL: PAINLEVEL_OUTOF10: 0-NO PAIN

## 2024-12-19 ENCOUNTER — HOSPITAL ENCOUNTER (OUTPATIENT)
Dept: RADIOLOGY | Facility: CLINIC | Age: 69
Discharge: HOME | End: 2024-12-19
Payer: MEDICARE

## 2024-12-19 ENCOUNTER — APPOINTMENT (OUTPATIENT)
Dept: BEHAVIORAL HEALTH | Facility: CLINIC | Age: 69
End: 2024-12-19
Payer: MEDICARE

## 2024-12-19 VITALS — HEIGHT: 67 IN | WEIGHT: 136.91 LBS | BODY MASS INDEX: 21.49 KG/M2

## 2024-12-19 VITALS
RESPIRATION RATE: 18 BRPM | DIASTOLIC BLOOD PRESSURE: 91 MMHG | HEIGHT: 68 IN | HEART RATE: 90 BPM | SYSTOLIC BLOOD PRESSURE: 147 MMHG | TEMPERATURE: 98.2 F | WEIGHT: 136.2 LBS | BODY MASS INDEX: 20.64 KG/M2

## 2024-12-19 DIAGNOSIS — Z12.31 VISIT FOR SCREENING MAMMOGRAM: ICD-10-CM

## 2024-12-19 DIAGNOSIS — F33.9 DEPRESSION, RECURRENT (CMS-HCC): ICD-10-CM

## 2024-12-19 DIAGNOSIS — R25.1 TREMOR: ICD-10-CM

## 2024-12-19 DIAGNOSIS — Z63.4 BEREAVEMENT: ICD-10-CM

## 2024-12-19 DIAGNOSIS — R63.4 WEIGHT LOSS: ICD-10-CM

## 2024-12-19 PROCEDURE — 1126F AMNT PAIN NOTED NONE PRSNT: CPT | Performed by: PSYCHIATRY & NEUROLOGY

## 2024-12-19 PROCEDURE — 77063 BREAST TOMOSYNTHESIS BI: CPT | Performed by: STUDENT IN AN ORGANIZED HEALTH CARE EDUCATION/TRAINING PROGRAM

## 2024-12-19 PROCEDURE — 3008F BODY MASS INDEX DOCD: CPT | Performed by: PSYCHIATRY & NEUROLOGY

## 2024-12-19 PROCEDURE — 77067 SCR MAMMO BI INCL CAD: CPT

## 2024-12-19 PROCEDURE — 3080F DIAST BP >= 90 MM HG: CPT | Performed by: PSYCHIATRY & NEUROLOGY

## 2024-12-19 PROCEDURE — 1157F ADVNC CARE PLAN IN RCRD: CPT | Performed by: PSYCHIATRY & NEUROLOGY

## 2024-12-19 PROCEDURE — 77067 SCR MAMMO BI INCL CAD: CPT | Performed by: STUDENT IN AN ORGANIZED HEALTH CARE EDUCATION/TRAINING PROGRAM

## 2024-12-19 PROCEDURE — 90792 PSYCH DIAG EVAL W/MED SRVCS: CPT | Performed by: PSYCHIATRY & NEUROLOGY

## 2024-12-19 PROCEDURE — 1160F RVW MEDS BY RX/DR IN RCRD: CPT | Performed by: PSYCHIATRY & NEUROLOGY

## 2024-12-19 PROCEDURE — 1159F MED LIST DOCD IN RCRD: CPT | Performed by: PSYCHIATRY & NEUROLOGY

## 2024-12-19 PROCEDURE — 1123F ACP DISCUSS/DSCN MKR DOCD: CPT | Performed by: PSYCHIATRY & NEUROLOGY

## 2024-12-19 PROCEDURE — 3077F SYST BP >= 140 MM HG: CPT | Performed by: PSYCHIATRY & NEUROLOGY

## 2024-12-19 PROCEDURE — 1036F TOBACCO NON-USER: CPT | Performed by: PSYCHIATRY & NEUROLOGY

## 2024-12-19 RX ORDER — TRAZODONE HYDROCHLORIDE 100 MG/1
100 TABLET ORAL NIGHTLY
COMMUNITY

## 2024-12-19 RX ORDER — PROPRANOLOL HYDROCHLORIDE 10 MG/1
10 TABLET ORAL 2 TIMES DAILY
Qty: 60 TABLET | Refills: 1 | Status: SHIPPED | OUTPATIENT
Start: 2024-12-19 | End: 2025-02-17

## 2024-12-19 SDOH — SOCIAL STABILITY - SOCIAL INSECURITY: DISSAPEARANCE AND DEATH OF FAMILY MEMBER: Z63.4

## 2024-12-19 ASSESSMENT — ENCOUNTER SYMPTOMS
AGITATION: 0
APPETITE CHANGE: 0
HALLUCINATIONS: 0
FATIGUE: 0
SLEEP DISTURBANCE: 0
DECREASED CONCENTRATION: 0
CONFUSION: 0
HYPERACTIVE: 0
VOMITING: 0
SHORTNESS OF BREATH: 0
DIARRHEA: 0
DYSPHORIC MOOD: 0
SEIZURES: 0
PALPITATIONS: 0
TREMORS: 1
NERVOUS/ANXIOUS: 0
NAUSEA: 0

## 2024-12-19 ASSESSMENT — PAIN SCALES - GENERAL: PAINLEVEL_OUTOF10: 0-NO PAIN

## 2024-12-19 NOTE — PROGRESS NOTES
Subjective   Patient ID: Elsa Rojo is a 69 y.o. female who presents for depression     HPI  Sees a therapist for 2 years,  passed away in 4-5 months ago. Was  for 46 years.   PCP started Ativan temporarily, last taken a month ago, helped but does not want to be on it. Also started Lexapro, the dose was titrated to 20. It helped but caused tremor   On Lexapro 20 for over a month, on Lexapro all together in October   In therapy for 2 years due to martial issues as her   struggled with alcoholism   Lexapro was started for depressive symptoms, sadness, crying spells with insomnia.   No thoughts of suicide but hopeless.   Lexapro helped, able to get up in the morning and do her chores. Feels it is helping and satisfied with the response. Lost 20 pounds since her  passed away, feels her appetite is back   Current Outpatient Medications on File Prior to Visit   Medication Sig Dispense Refill    aspirin 81 mg EC tablet Take 1 tablet (81 mg) by mouth once daily.      atorvastatin (Lipitor) 20 mg tablet TAKE 1 TABLET DAILY 90 tablet 3    betamethasone, augmented, (Diprolene, augmented,) 0.05 % ointment Apply topically 2 times a day. 15 g 0    CALCIUM ORAL Take 1 tablet by mouth once daily.      cholecalciferol (Vitamin D-3) 50 mcg (2,000 unit) capsule Take 2 capsules (100 mcg) by mouth once daily.      escitalopram (Lexapro) 20 mg tablet Take 1 tablet (20 mg) by mouth once daily. 90 tablet 3    HAIR, SKIN AND NAILS, BIOTIN, ORAL Take 1 capsule by mouth once daily.      traZODone (Desyrel) 100 mg tablet Take 1 tablet (100 mg) by mouth once daily at bedtime.      [DISCONTINUED] LORazepam (Ativan) 1 mg tablet Take 1 tablet (1 mg) by mouth 2 times a day as needed for anxiety for up to 10 days. 20 tablet 0     No current facility-administered medications on file prior to visit.      Patient Active Problem List   Diagnosis    Hyperlipidemia    Acquired hammertoe of right foot    Anxiety     Benign colonic polyp    Tubular adenoma of colon    Benign essential hypertension    Blocked ear    CAD (coronary artery disease), native coronary artery    Coronary artery calcification    Dizziness    Hyperplastic colon polyp    Impacted cerumen of both ears    Impacted cerumen of right ear    Left knee pain    Left shoulder pain    Lichen sclerosus    Liver cyst    Mild atherosclerosis of carotid artery    Multiple thyroid nodules    Osteopenia    Pre-ulcerative corn or callous    Chronic pain of toe of right foot    Toe pain    Urinary frequency    Vaginal candidiasis    Vaginal itching    Depression, recurrent (CMS-HCC)    Visit for screening mammogram    Dermatitis, unspecified    Rosacea, unspecified    Left elbow pain    Other insomnia    Right hip pain    Lichen sclerosus of vulva    Breast pain in female    Acquired hallux malleus    Diverticulosis of colon    History of adenomatous polyp of colon    Lateral epicondylitis of left elbow    Microscopic hematuria    History of invasive breast cancer    Disorder of hip region    Trochanteric bursitis of right hip    Reactive depression    Thyroid nodule    Medicare annual wellness visit, subsequent    Bereavement    Weight loss    High risk medication use    UTI symptoms    Mass of left foot    Stenosis of left carotid artery    Foot mass, left      Past Psychiatric History:  Wellbutrin caused hives  No past inpatient admissions, no history of suicide or self harm   No history of depression   Substance Abuse History:  None  Family History:  Family History   Problem Relation Name Age of Onset    Motor neuron disease Mother      Lung cancer Father      Lymphoma Sister      Sister suffers from Anorexia and alcoholism   Social History:  Social History     Socioeconomic History    Marital status:      Spouse name: Not on file    Number of children: Not on file    Years of education: Not on file    Highest education level: Not on file   Occupational History     Occupation: retired   Tobacco Use    Smoking status: Former     Current packs/day: 0.00     Types: Cigarettes     Quit date: 2006     Years since quittin.9    Smokeless tobacco: Never   Vaping Use    Vaping status: Never Used   Substance and Sexual Activity    Alcohol use: Yes     Comment: VERY SOCIAL    Drug use: Not Currently     Types: Marijuana     Comment: teenager.    Sexual activity: Defer     Comment:     Other Topics Concern    Not on file   Social History Narrative    Not on file     Social Drivers of Health     Financial Resource Strain: Not on file   Food Insecurity: Not on file   Transportation Needs: Not on file   Physical Activity: Not on file   Stress: Not on file   Social Connections: Not on file   Intimate Partner Violence: Not on file   Housing Stability: Not on file       recently, one adopted daughter in [place] not good relationship   Born in [place], raised by parents. One of 7. High school, then some college. Retired.   Lives by herself.   No history of physical or sexual abuse.           Review of Systems   Constitutional:  Negative for appetite change and fatigue.   Respiratory:  Negative for shortness of breath.    Cardiovascular:  Negative for palpitations.   Gastrointestinal:  Negative for diarrhea, nausea and vomiting.   Musculoskeletal:  Negative for gait problem.   Neurological:  Positive for tremors. Negative for seizures.        Since the start of Lexapro, intentional tremor    Psychiatric/Behavioral:  Negative for agitation, behavioral problems, confusion, decreased concentration, dysphoric mood, hallucinations, self-injury, sleep disturbance and suicidal ideas. The patient is not nervous/anxious and is not hyperactive.        Objective   Vitals:    24 0958   BP: (!) 147/91   Pulse: 90   Resp: 18   Temp: 36.8 °C (98.2 °F)      Physical Exam  Psychiatric:         Attention and Perception: Attention normal.         Mood and Affect: Mood is depressed.          Speech: Speech normal.         Behavior: Behavior normal.         Thought Content: Thought content normal.         Cognition and Memory: Cognition normal.         Judgment: Judgment normal.         Lab Review:   Lab on 10/11/2024   Component Date Value    Amphetamine Screen, Urine 10/11/2024 Presumptive Negative     Barbiturate Screen, Urine 10/11/2024 Presumptive Negative     Benzodiazepines Screen, * 10/11/2024 Presumptive Negative     Cannabinoid Screen, Urine 10/11/2024 Presumptive Negative     Cocaine Metabolite Scree* 10/11/2024 Presumptive Negative     Fentanyl Screen, Urine 10/11/2024 Presumptive Negative     Opiate Screen, Urine 10/11/2024 Presumptive Negative     Oxycodone Screen, Urine 10/11/2024 Presumptive Negative     PCP Screen, Urine 10/11/2024 Presumptive Negative     Methadone Screen, Urine 10/11/2024 Presumptive Negative     Color, Urine 10/11/2024 Light-Yellow     Appearance, Urine 10/11/2024 Clear     Specific Gravity, Urine 10/11/2024 1.009     pH, Urine 10/11/2024 5.5     Protein, Urine 10/11/2024 NEGATIVE     Glucose, Urine 10/11/2024 Normal     Blood, Urine 10/11/2024 NEGATIVE     Ketones, Urine 10/11/2024 NEGATIVE     Bilirubin, Urine 10/11/2024 NEGATIVE     Urobilinogen, Urine 10/11/2024 Normal     Nitrite, Urine 10/11/2024 NEGATIVE     Leukocyte Esterase, Urine 10/11/2024 NEGATIVE     Urine Culture 10/11/2024 Normal genitourinary alexandra    Lab on 07/12/2024   Component Date Value    WBC 07/12/2024 6.1     nRBC 07/12/2024 0.0     RBC 07/12/2024 4.27     Hemoglobin 07/12/2024 13.4     Hematocrit 07/12/2024 42.0     MCV 07/12/2024 98     MCH 07/12/2024 31.4     MCHC 07/12/2024 31.9 (L)     RDW 07/12/2024 11.9     Platelets 07/12/2024 238     Glucose 07/12/2024 100 (H)     Sodium 07/12/2024 141     Potassium 07/12/2024 4.3     Chloride 07/12/2024 104     Bicarbonate 07/12/2024 29     Anion Gap 07/12/2024 12     Urea Nitrogen 07/12/2024 19     Creatinine 07/12/2024 0.73     eGFR 07/12/2024 89      Calcium 07/12/2024 9.5     Albumin 07/12/2024 4.4     Alkaline Phosphatase 07/12/2024 50     Total Protein 07/12/2024 6.7     AST 07/12/2024 22     Bilirubin, Total 07/12/2024 1.5 (H)     ALT 07/12/2024 21     Cholesterol 07/12/2024 150     HDL-Cholesterol 07/12/2024 63.3     Cholesterol/HDL Ratio 07/12/2024 2.4     LDL Calculated 07/12/2024 72     VLDL 07/12/2024 14     Triglycerides 07/12/2024 72     Non HDL Cholesterol 07/12/2024 87     Thyroid Stimulating Horm* 07/12/2024 1.56      Lab Results   Component Value Date     07/12/2024     07/25/2023     07/19/2022     08/05/2021    K 4.3 07/12/2024    K 4.1 07/25/2023    K 3.8 07/19/2022    K 3.8 08/05/2021    CO2 29 07/12/2024    CO2 31 07/25/2023    CO2 29 07/19/2022    CO2 30 08/05/2021    BUN 19 07/12/2024    BUN 14 07/25/2023    BUN 15 07/19/2022    BUN 15 08/05/2021    CREATININE 0.73 07/12/2024    CREATININE 0.67 07/25/2023    CREATININE 0.65 07/19/2022    CREATININE 0.68 08/05/2021    GLUCOSE 100 (H) 07/12/2024    GLUCOSE 94 07/25/2023    GLUCOSE 95 07/19/2022    GLUCOSE 90 08/05/2021    CALCIUM 9.5 07/12/2024    CALCIUM 9.8 07/25/2023    CALCIUM 9.7 07/19/2022    CALCIUM 9.3 08/05/2021     Lab Results   Component Value Date    WBC 6.1 07/12/2024    HGB 13.4 07/12/2024    HCT 42.0 07/12/2024    MCV 98 07/12/2024     07/12/2024     Lab Results   Component Value Date    CHOL 150 07/12/2024    TRIG 72 07/12/2024    HDL 63.3 07/12/2024       Assessment/Plan   Problem List Items Addressed This Visit       Depression, recurrent (CMS-HCC)    Bereavement    Weight loss     Other Visit Diagnoses       Tremor        Relevant Medications    propranolol (Inderal) 10 mg tablet          Risks, benefits, side effects and alternatives discussed. After discussion, we agreed to continue Lexapro, Trazodone PRN, a trial of Propranolol 10 bid for tremor   Continue therapy   Follow up in 1-2 months or sooner if needed  Connor Carroll MD

## 2025-01-02 DIAGNOSIS — F33.9 DEPRESSION, RECURRENT (CMS-HCC): ICD-10-CM

## 2025-01-02 RX ORDER — TRAZODONE HYDROCHLORIDE 100 MG/1
100 TABLET ORAL NIGHTLY
Qty: 90 TABLET | Refills: 0 | Status: SHIPPED | OUTPATIENT
Start: 2025-01-02

## 2025-01-20 ENCOUNTER — APPOINTMENT (OUTPATIENT)
Dept: PRIMARY CARE | Facility: CLINIC | Age: 70
End: 2025-01-20
Payer: MEDICARE

## 2025-01-27 ENCOUNTER — APPOINTMENT (OUTPATIENT)
Dept: PRIMARY CARE | Facility: CLINIC | Age: 70
End: 2025-01-27
Payer: MEDICARE

## 2025-01-27 VITALS
DIASTOLIC BLOOD PRESSURE: 77 MMHG | SYSTOLIC BLOOD PRESSURE: 124 MMHG | WEIGHT: 136.4 LBS | BODY MASS INDEX: 20.74 KG/M2 | OXYGEN SATURATION: 98 % | TEMPERATURE: 98.1 F | HEART RATE: 58 BPM

## 2025-01-27 DIAGNOSIS — E78.49 OTHER HYPERLIPIDEMIA: ICD-10-CM

## 2025-01-27 DIAGNOSIS — M25.512 CHRONIC LEFT SHOULDER PAIN: ICD-10-CM

## 2025-01-27 DIAGNOSIS — G47.09 OTHER INSOMNIA: ICD-10-CM

## 2025-01-27 DIAGNOSIS — I10 BENIGN ESSENTIAL HYPERTENSION: Primary | ICD-10-CM

## 2025-01-27 DIAGNOSIS — G89.29 CHRONIC LEFT SHOULDER PAIN: ICD-10-CM

## 2025-01-27 DIAGNOSIS — F33.9 DEPRESSION, RECURRENT (CMS-HCC): ICD-10-CM

## 2025-01-27 DIAGNOSIS — I25.10 CORONARY ARTERY DISEASE INVOLVING NATIVE CORONARY ARTERY OF NATIVE HEART WITHOUT ANGINA PECTORIS: ICD-10-CM

## 2025-01-27 DIAGNOSIS — Z63.4 BEREAVEMENT: ICD-10-CM

## 2025-01-27 DIAGNOSIS — M85.80 OSTEOPENIA, UNSPECIFIED LOCATION: ICD-10-CM

## 2025-01-27 PROCEDURE — 1157F ADVNC CARE PLAN IN RCRD: CPT | Performed by: INTERNAL MEDICINE

## 2025-01-27 PROCEDURE — 1159F MED LIST DOCD IN RCRD: CPT | Performed by: INTERNAL MEDICINE

## 2025-01-27 PROCEDURE — 1036F TOBACCO NON-USER: CPT | Performed by: INTERNAL MEDICINE

## 2025-01-27 PROCEDURE — 1160F RVW MEDS BY RX/DR IN RCRD: CPT | Performed by: INTERNAL MEDICINE

## 2025-01-27 PROCEDURE — G2211 COMPLEX E/M VISIT ADD ON: HCPCS | Performed by: INTERNAL MEDICINE

## 2025-01-27 PROCEDURE — 3078F DIAST BP <80 MM HG: CPT | Performed by: INTERNAL MEDICINE

## 2025-01-27 PROCEDURE — 99214 OFFICE O/P EST MOD 30 MIN: CPT | Performed by: INTERNAL MEDICINE

## 2025-01-27 PROCEDURE — 1123F ACP DISCUSS/DSCN MKR DOCD: CPT | Performed by: INTERNAL MEDICINE

## 2025-01-27 PROCEDURE — 3074F SYST BP LT 130 MM HG: CPT | Performed by: INTERNAL MEDICINE

## 2025-01-27 RX ORDER — ESCITALOPRAM OXALATE 20 MG/1
20 TABLET ORAL DAILY
Qty: 90 TABLET | Refills: 0 | Status: SHIPPED | OUTPATIENT
Start: 2025-01-27 | End: 2026-01-27

## 2025-01-27 SDOH — SOCIAL STABILITY - SOCIAL INSECURITY: DISSAPEARANCE AND DEATH OF FAMILY MEMBER: Z63.4

## 2025-01-27 ASSESSMENT — ENCOUNTER SYMPTOMS
CARDIOVASCULAR NEGATIVE: 1
CONSTITUTIONAL NEGATIVE: 1
NEUROLOGICAL NEGATIVE: 1
GASTROINTESTINAL NEGATIVE: 1
EYES NEGATIVE: 1
RESPIRATORY NEGATIVE: 1
HEMATOLOGIC/LYMPHATIC NEGATIVE: 1
PSYCHIATRIC NEGATIVE: 1

## 2025-01-27 ASSESSMENT — PATIENT HEALTH QUESTIONNAIRE - PHQ9
1. LITTLE INTEREST OR PLEASURE IN DOING THINGS: NOT AT ALL
SUM OF ALL RESPONSES TO PHQ9 QUESTIONS 1 AND 2: 0
2. FEELING DOWN, DEPRESSED OR HOPELESS: NOT AT ALL

## 2025-01-27 NOTE — PROGRESS NOTES
Subjective   Patient ID: Elsa Rojo is a 69 y.o. female who presents for Follow-up (Patient is here for a 3 month follow up. ).    Patient is here for follow-up.  She is feeling better in general.  She has occasional left shoulder pain whenever she moves with like working out, no chest pain or shortness of breath.  She lost her  in the last year, she has been seen by psychiatrist  and has been responding well to current SSRI he added propranolol to help with the tremor that were caused by Lexapro.  She has been off benzodiazepine.  She has good support from her friend and from her Baptism.  She has been doing grief support group as well  Her weight stabilized, she lost 20 pounds end of last year with her 's illness and death.  She saw cardiologist, no recurrence of her chest pain.  She will see her gynecologist Dr. Al for her urine issues, she still has to sit on the toilet to urinate and takes a while sometimes but the urine flow is normal she denies incontinence  she has HTN, thyroid nodule and mild minimal plaque carotid deposit on the right side  Last colonoscopy 4/1/2021, next in 5 years which would be 26.  Last mammogram 12/14/2023.  Last DEXA 12/16/2022  She is a former smoker quit in 2006, denies any cough or shortness of breath.  She is also a left breast cancer survivor was diagnosed in 2006.         Review of Systems   Constitutional: Negative.    HENT: Negative.     Eyes: Negative.    Respiratory: Negative.     Cardiovascular: Negative.    Gastrointestinal: Negative.    Genitourinary: Negative.         As HPI.   Neurological: Negative.    Hematological: Negative.    Psychiatric/Behavioral: Negative.          As HPI.       Objective   /77 (BP Location: Right arm, Patient Position: Sitting, BP Cuff Size: Adult)   Pulse 58   Temp 36.7 °C (98.1 °F) (Temporal)   Wt 61.9 kg (136 lb 6.4 oz)   SpO2 98%   BMI 20.74 kg/m²     Physical Exam  Constitutional:       Appearance:  Normal appearance.   HENT:      Head: Normocephalic and atraumatic.   Eyes:      Extraocular Movements: Extraocular movements intact.      Pupils: Pupils are equal, round, and reactive to light.   Cardiovascular:      Rate and Rhythm: Normal rate and regular rhythm.      Heart sounds: Normal heart sounds.   Pulmonary:      Effort: Pulmonary effort is normal.      Breath sounds: Normal breath sounds. No wheezing or rhonchi.   Abdominal:      General: Abdomen is flat. Bowel sounds are normal. There is no distension.      Palpations: Abdomen is soft.   Musculoskeletal:      Cervical back: Normal range of motion and neck supple.      Right lower leg: No edema.      Left lower leg: No edema.      Comments: Left shoulder mild clicking sound with rotation normal range of motion, no pain   Skin:     General: Skin is warm.   Neurological:      General: No focal deficit present.      Mental Status: She is alert and oriented to person, place, and time.   Psychiatric:         Mood and Affect: Mood normal.         Behavior: Behavior normal.         Assessment/Plan   Problem List Items Addressed This Visit             ICD-10-CM    Hyperlipidemia E78.5    Relevant Orders    Lipid Panel    Benign essential hypertension - Primary I10     Stable on current medication.         Relevant Orders    CBC    Comprehensive Metabolic Panel    CAD (coronary artery disease), native coronary artery I25.10      patient denies any chest pain  Patient was seen Dr Emanuel,cardiologist.  Continue statin.         Left shoulder pain M25.512     Suspect osteoarthritis.  Stretching exercise as per provided handout.         Osteopenia M85.80    Depression, recurrent (CMS-HCC) F33.9     Improving with current medication Lexapro and trazodone, managed by the psychiatrist.         Relevant Medications    escitalopram (Lexapro) 20 mg tablet    Other Relevant Orders    TSH with reflex to Free T4 if abnormal    Other insomnia G47.09     Responding well to  trazodone.  Continue lexapro .  Follow-up with the psychiatrist.         Bereavement Z63.4     Medication managed by the psychiatrist .

## 2025-02-04 NOTE — PROGRESS NOTES
Subjective   Patient ID: Elsa Rojo is a 69 y.o. female who presents for Follow-up (Vaginal dryness, trouble starting urine flow///Chaperone Declined: BRYANNA Monson/).  New relationship. Old and dry.    Breast cancer survivor, concerned about hormones. Are there other things?   Dr. Esquivel said to start with me.  Frequent urination all her life. Now, she has urinary urgency, she has been training to her bladder to wait.  Then, she has to tell her brain to let go. Had UA, that was fine. Not so bad at night.         Review of Systems    Objective   Physical Exam  Constitutional:       Appearance: Normal appearance. She is normal weight.   Genitourinary:     Exam position: Lithotomy position.      Vagina: Normal.      Cervix: Normal.      Uterus: Normal.       Adnexa: Right adnexa normal and left adnexa normal.          Comments: Healing shallow laceration  Neurological:      Mental Status: She is alert.         Assessment/Plan   Problem List Items Addressed This Visit             ICD-10-CM       Ob-Gyn Problems    Vaginal dryness, menopausal - Primary N95.1   Discussed vaginal moisturizers such as Replens or vaginal estrogen. For now, will try vaginal moisturizers.   The other urinary symptoms are not unusual. I offered referral to urogyn, but you are fine for now.         Ruby Alcocer MD 02/05/25 9:54 AM

## 2025-02-05 ENCOUNTER — APPOINTMENT (OUTPATIENT)
Dept: OBSTETRICS AND GYNECOLOGY | Facility: CLINIC | Age: 70
End: 2025-02-05
Payer: MEDICARE

## 2025-02-05 VITALS
BODY MASS INDEX: 20.61 KG/M2 | SYSTOLIC BLOOD PRESSURE: 114 MMHG | WEIGHT: 136 LBS | HEIGHT: 68 IN | DIASTOLIC BLOOD PRESSURE: 70 MMHG

## 2025-02-05 DIAGNOSIS — N95.1 VAGINAL DRYNESS, MENOPAUSAL: Primary | ICD-10-CM

## 2025-02-05 PROCEDURE — 1126F AMNT PAIN NOTED NONE PRSNT: CPT | Performed by: OBSTETRICS & GYNECOLOGY

## 2025-02-05 PROCEDURE — 1036F TOBACCO NON-USER: CPT | Performed by: OBSTETRICS & GYNECOLOGY

## 2025-02-05 PROCEDURE — 1159F MED LIST DOCD IN RCRD: CPT | Performed by: OBSTETRICS & GYNECOLOGY

## 2025-02-05 PROCEDURE — 3008F BODY MASS INDEX DOCD: CPT | Performed by: OBSTETRICS & GYNECOLOGY

## 2025-02-05 PROCEDURE — 1157F ADVNC CARE PLAN IN RCRD: CPT | Performed by: OBSTETRICS & GYNECOLOGY

## 2025-02-05 PROCEDURE — 3078F DIAST BP <80 MM HG: CPT | Performed by: OBSTETRICS & GYNECOLOGY

## 2025-02-05 PROCEDURE — 1123F ACP DISCUSS/DSCN MKR DOCD: CPT | Performed by: OBSTETRICS & GYNECOLOGY

## 2025-02-05 PROCEDURE — 3074F SYST BP LT 130 MM HG: CPT | Performed by: OBSTETRICS & GYNECOLOGY

## 2025-02-05 PROCEDURE — 99212 OFFICE O/P EST SF 10 MIN: CPT | Performed by: OBSTETRICS & GYNECOLOGY

## 2025-02-05 PROCEDURE — 1160F RVW MEDS BY RX/DR IN RCRD: CPT | Performed by: OBSTETRICS & GYNECOLOGY

## 2025-02-05 ASSESSMENT — PAIN SCALES - GENERAL: PAINLEVEL_OUTOF10: 0-NO PAIN

## 2025-02-10 DIAGNOSIS — R25.1 TREMOR: ICD-10-CM

## 2025-02-10 RX ORDER — PROPRANOLOL HYDROCHLORIDE 10 MG/1
10 TABLET ORAL 2 TIMES DAILY
Qty: 60 TABLET | Refills: 1 | Status: SHIPPED | OUTPATIENT
Start: 2025-02-10 | End: 2025-04-11

## 2025-02-14 ENCOUNTER — APPOINTMENT (OUTPATIENT)
Dept: BEHAVIORAL HEALTH | Facility: CLINIC | Age: 70
End: 2025-02-14
Payer: MEDICARE

## 2025-02-28 ENCOUNTER — APPOINTMENT (OUTPATIENT)
Dept: BEHAVIORAL HEALTH | Facility: CLINIC | Age: 70
End: 2025-02-28
Payer: MEDICARE

## 2025-02-28 VITALS
RESPIRATION RATE: 18 BRPM | HEART RATE: 65 BPM | SYSTOLIC BLOOD PRESSURE: 131 MMHG | HEIGHT: 68 IN | BODY MASS INDEX: 20.49 KG/M2 | WEIGHT: 135.2 LBS | TEMPERATURE: 97.8 F | DIASTOLIC BLOOD PRESSURE: 72 MMHG

## 2025-02-28 DIAGNOSIS — F33.9 DEPRESSION, RECURRENT (CMS-HCC): ICD-10-CM

## 2025-02-28 DIAGNOSIS — Z63.4 BEREAVEMENT: ICD-10-CM

## 2025-02-28 DIAGNOSIS — R25.1 TREMOR: ICD-10-CM

## 2025-02-28 PROCEDURE — 1157F ADVNC CARE PLAN IN RCRD: CPT | Performed by: PSYCHIATRY & NEUROLOGY

## 2025-02-28 PROCEDURE — 1123F ACP DISCUSS/DSCN MKR DOCD: CPT | Performed by: PSYCHIATRY & NEUROLOGY

## 2025-02-28 PROCEDURE — 1160F RVW MEDS BY RX/DR IN RCRD: CPT | Performed by: PSYCHIATRY & NEUROLOGY

## 2025-02-28 PROCEDURE — 1126F AMNT PAIN NOTED NONE PRSNT: CPT | Performed by: PSYCHIATRY & NEUROLOGY

## 2025-02-28 PROCEDURE — 3008F BODY MASS INDEX DOCD: CPT | Performed by: PSYCHIATRY & NEUROLOGY

## 2025-02-28 PROCEDURE — 1159F MED LIST DOCD IN RCRD: CPT | Performed by: PSYCHIATRY & NEUROLOGY

## 2025-02-28 PROCEDURE — 99214 OFFICE O/P EST MOD 30 MIN: CPT | Performed by: PSYCHIATRY & NEUROLOGY

## 2025-02-28 PROCEDURE — 3075F SYST BP GE 130 - 139MM HG: CPT | Performed by: PSYCHIATRY & NEUROLOGY

## 2025-02-28 PROCEDURE — 3078F DIAST BP <80 MM HG: CPT | Performed by: PSYCHIATRY & NEUROLOGY

## 2025-02-28 RX ORDER — TRAZODONE HYDROCHLORIDE 100 MG/1
100 TABLET ORAL NIGHTLY
Qty: 90 TABLET | Refills: 1 | Status: SHIPPED | OUTPATIENT
Start: 2025-02-28

## 2025-02-28 RX ORDER — PROPRANOLOL HYDROCHLORIDE 10 MG/1
10 TABLET ORAL 2 TIMES DAILY
Qty: 180 TABLET | Refills: 1 | Status: SHIPPED | OUTPATIENT
Start: 2025-02-28 | End: 2025-08-27

## 2025-02-28 RX ORDER — ESCITALOPRAM OXALATE 20 MG/1
20 TABLET ORAL DAILY
Qty: 90 TABLET | Refills: 1 | Status: SHIPPED | OUTPATIENT
Start: 2025-02-28 | End: 2025-08-27

## 2025-02-28 SDOH — SOCIAL STABILITY - SOCIAL INSECURITY: DISSAPEARANCE AND DEATH OF FAMILY MEMBER: Z63.4

## 2025-02-28 ASSESSMENT — ENCOUNTER SYMPTOMS
TREMORS: 0
NAUSEA: 0
BRUISES/BLEEDS EASILY: 0
AGITATION: 0
APPETITE CHANGE: 0
NERVOUS/ANXIOUS: 0
SLEEP DISTURBANCE: 0
FATIGUE: 0
DYSPHORIC MOOD: 0

## 2025-02-28 ASSESSMENT — PAIN SCALES - GENERAL: PAINLEVEL_OUTOF10: 0-NO PAIN

## 2025-02-28 NOTE — PROGRESS NOTES
Subjective   Patient ID: Elsa Rojo is a 69 y.o. female who presents for follow up  HPI  Last domenico, we continued Lexapro, added Trazodone for sleep and Propranolol for tremor   She feels better   Has good support system, does volunteering work and like it, keeps her busy. Handling the grief well. In grief counseling.   Tremor is gone on Propranolol and sleep is good on Trazodone   Review of Systems   Constitutional:  Negative for appetite change and fatigue.   Gastrointestinal:  Negative for nausea.   Musculoskeletal:  Negative for gait problem.   Neurological:  Negative for tremors.   Hematological:  Does not bruise/bleed easily.   Psychiatric/Behavioral:  Negative for agitation, dysphoric mood, sleep disturbance and suicidal ideas. The patient is not nervous/anxious.        Objective   Physical Exam  Psychiatric:         Attention and Perception: Attention normal.         Mood and Affect: Mood normal.         Speech: Speech normal.         Behavior: Behavior is cooperative.         Thought Content: Thought content normal.         Cognition and Memory: Cognition normal.         Judgment: Judgment normal.       There were no vitals filed for this visit.   Lab on 10/11/2024   Component Date Value    Amphetamine Screen, Urine 10/11/2024 Presumptive Negative     Barbiturate Screen, Urine 10/11/2024 Presumptive Negative     Benzodiazepines Screen, * 10/11/2024 Presumptive Negative     Cannabinoid Screen, Urine 10/11/2024 Presumptive Negative     Cocaine Metabolite Scree* 10/11/2024 Presumptive Negative     Fentanyl Screen, Urine 10/11/2024 Presumptive Negative     Opiate Screen, Urine 10/11/2024 Presumptive Negative     Oxycodone Screen, Urine 10/11/2024 Presumptive Negative     PCP Screen, Urine 10/11/2024 Presumptive Negative     Methadone Screen, Urine 10/11/2024 Presumptive Negative     Color, Urine 10/11/2024 Light-Yellow     Appearance, Urine 10/11/2024 Clear     Specific Gravity, Urine 10/11/2024 1.009     pH,  Urine 10/11/2024 5.5     Protein, Urine 10/11/2024 NEGATIVE     Glucose, Urine 10/11/2024 Normal     Blood, Urine 10/11/2024 NEGATIVE     Ketones, Urine 10/11/2024 NEGATIVE     Bilirubin, Urine 10/11/2024 NEGATIVE     Urobilinogen, Urine 10/11/2024 Normal     Nitrite, Urine 10/11/2024 NEGATIVE     Leukocyte Esterase, Urine 10/11/2024 NEGATIVE     Urine Culture 10/11/2024 Normal genitourinary alexandra      Lab Results   Component Value Date     07/12/2024     07/25/2023     07/19/2022     08/05/2021    K 4.3 07/12/2024    K 4.1 07/25/2023    K 3.8 07/19/2022    K 3.8 08/05/2021    CO2 29 07/12/2024    CO2 31 07/25/2023    CO2 29 07/19/2022    CO2 30 08/05/2021    BUN 19 07/12/2024    BUN 14 07/25/2023    BUN 15 07/19/2022    BUN 15 08/05/2021    CREATININE 0.73 07/12/2024    CREATININE 0.67 07/25/2023    CREATININE 0.65 07/19/2022    CREATININE 0.68 08/05/2021    GLUCOSE 100 (H) 07/12/2024    GLUCOSE 94 07/25/2023    GLUCOSE 95 07/19/2022    GLUCOSE 90 08/05/2021    CALCIUM 9.5 07/12/2024    CALCIUM 9.8 07/25/2023    CALCIUM 9.7 07/19/2022    CALCIUM 9.3 08/05/2021     Lab Results   Component Value Date    WBC 6.1 07/12/2024    HGB 13.4 07/12/2024    HCT 42.0 07/12/2024    MCV 98 07/12/2024     07/12/2024     Lab Results   Component Value Date    CHOL 150 07/12/2024    TRIG 72 07/12/2024    HDL 63.3 07/12/2024     Current Outpatient Medications on File Prior to Visit   Medication Sig Dispense Refill    aspirin 81 mg EC tablet Take 1 tablet (81 mg) by mouth once daily.      atorvastatin (Lipitor) 20 mg tablet TAKE 1 TABLET DAILY 90 tablet 3    betamethasone, augmented, (Diprolene, augmented,) 0.05 % ointment Apply topically 2 times a day. 15 g 0    CALCIUM ORAL Take 1 tablet by mouth once daily.      cholecalciferol (Vitamin D-3) 50 mcg (2,000 unit) capsule Take 2 capsules (100 mcg) by mouth once daily.      escitalopram (Lexapro) 20 mg tablet Take 1 tablet (20 mg) by mouth once daily. 90  tablet 0    HAIR, SKIN AND NAILS, BIOTIN, ORAL Take 1 capsule by mouth once daily.      propranolol (Inderal) 10 mg tablet Take 1 tablet (10 mg) by mouth 2 times a day. 60 tablet 1    traZODone (Desyrel) 100 mg tablet Take 1 tablet (100 mg) by mouth once daily at bedtime. 90 tablet 0     No current facility-administered medications on file prior to visit.      Patient Active Problem List   Diagnosis    Hyperlipidemia    Acquired hammertoe of right foot    Anxiety    Benign colonic polyp    Tubular adenoma of colon    Benign essential hypertension    Blocked ear    CAD (coronary artery disease), native coronary artery    Coronary artery calcification    Dizziness    Hyperplastic colon polyp    Impacted cerumen of both ears    Impacted cerumen of right ear    Left knee pain    Left shoulder pain    Lichen sclerosus    Liver cyst    Mild atherosclerosis of carotid artery    Multiple thyroid nodules    Osteopenia    Pre-ulcerative corn or callous    Chronic pain of toe of right foot    Toe pain    Urinary frequency    Vaginal candidiasis    Vaginal itching    Depression, recurrent (CMS-[facility])    Visit for screening mammogram    Dermatitis, unspecified    Rosacea, unspecified    Left elbow pain    Other insomnia    Right hip pain    Lichen sclerosus of vulva    Breast pain in female    Acquired hallux malleus    Diverticulosis of colon    History of adenomatous polyp of colon    Lateral epicondylitis of left elbow    Microscopic hematuria    History of invasive breast cancer    Disorder of hip region    Trochanteric bursitis of right hip    Reactive depression    Thyroid nodule    Medicare annual wellness visit, subsequent    Bereavement    Weight loss    High risk medication use    UTI symptoms    Mass of left foot    Stenosis of left carotid artery    Foot mass, left    Vaginal dryness, menopausal      Assessment/Plan   Problem List Items Addressed This Visit             ICD-10-CM    Depression, recurrent (CMS-[facility]) F33.9     Bereavement Z63.4    Tremor R25.1     Will continue Lexapro 20, Propranolol 10 bid and Trazodone 100 mg po at bedtime prn   Follow up in 3-4 months or sooner if needed  Continue therapy

## 2025-04-15 ENCOUNTER — APPOINTMENT (OUTPATIENT)
Facility: CLINIC | Age: 70
End: 2025-04-15
Payer: MEDICARE

## 2025-04-15 VITALS
SYSTOLIC BLOOD PRESSURE: 128 MMHG | DIASTOLIC BLOOD PRESSURE: 81 MMHG | BODY MASS INDEX: 20.61 KG/M2 | HEIGHT: 68 IN | WEIGHT: 136 LBS

## 2025-04-15 DIAGNOSIS — L29.9 EAR ITCHING: ICD-10-CM

## 2025-04-15 DIAGNOSIS — H61.23 IMPACTED CERUMEN OF BOTH EARS: Primary | ICD-10-CM

## 2025-04-15 PROCEDURE — 1159F MED LIST DOCD IN RCRD: CPT

## 2025-04-15 PROCEDURE — 3074F SYST BP LT 130 MM HG: CPT

## 2025-04-15 PROCEDURE — 1036F TOBACCO NON-USER: CPT

## 2025-04-15 PROCEDURE — 69210 REMOVE IMPACTED EAR WAX UNI: CPT

## 2025-04-15 PROCEDURE — 1160F RVW MEDS BY RX/DR IN RCRD: CPT

## 2025-04-15 PROCEDURE — 1123F ACP DISCUSS/DSCN MKR DOCD: CPT

## 2025-04-15 PROCEDURE — 1157F ADVNC CARE PLAN IN RCRD: CPT

## 2025-04-15 PROCEDURE — 3079F DIAST BP 80-89 MM HG: CPT

## 2025-04-15 PROCEDURE — 3008F BODY MASS INDEX DOCD: CPT

## 2025-04-15 PROCEDURE — 99203 OFFICE O/P NEW LOW 30 MIN: CPT

## 2025-04-15 NOTE — PATIENT INSTRUCTIONS
INSTRUCTIONS FOR SAFE EAR CLEANING AT HOME:   We do NOT recommend placing ANYTHING in the ear canal. Doing so may cause wax to be pushed back into the ear canal and stuck. It also risks injury to the ear canal and ear drum. We recommend avoiding using cotton tipped applicators, tissues, paper, or any rigid object in the ear canal. Some people require regular cleanings every year or so to keep the ear canals open.  To clean the ears, wash the external ear with a cloth, but do not insert anything into the ear canal. Most cases of ear wax blockage respond to home treatments used to soften wax. You may try placing 1-2 drops of mineral oil or baby oil, no more than once a week, to help keep the wax soft.

## 2025-04-15 NOTE — PROGRESS NOTES
Patient ID: Elsa Rojo is a 69 y.o. female who presents for ear evaluation and for earwax removal.    PROVIDER IMPRESSIONS:  DIAGNOSES/PROBLEMS:  - Cerumen impaction of both ears   - Bilateral ear itching     ASSESSMENT: Elsa Rojo is a 69 y.o. female who presents for an initial encounter with symptoms and clinical findings that are consistent with bilateral cerumen impaction. Using appropriate instrumentation, impacted cerumen was successfully removed from the EAC bilaterally. Patient does endorse symptom benefit immediately following the procedure today. Otologic exam today under microscopy revealed no evidence of EAC infection/inflammation bilaterally and no evidence of infection, effusion, retraction or perforation of the TM bilaterally.    PLAN:   -I counseled patient on safe interventions for cerumen management at home. Patient may wash the external ear with a cloth. I reinforced education to the patient that they should avoid using cotton tipped applicators, tissues, paper, or any rigid object in the ear canal. I explained to the patient that doing so may cause wax to be pushed back into the ear canal and stuck and also risks injury to the ear canal and ear drum.   - I recommended that patient initiates use of 1-2 drops of mineral/sweet/baby oil into the bilateral EAC 1x/month for routine EAC/skin maintenance and to soften cerumen. Patient was counseled on the indications, possible side effects, and proper administration of medication. Patient informed that non-medicated otic oil drops may be purchased over the counter.  -Follow-up: Patient may schedule for routine evaluation for the removal of cerumen impaction as needed. Patient is agreeable to plan. All questions answered to patient satisfaction.    Subjective    HPI: Elsa Rojo is a 69 y.o. female who presents for an initial evaluation for the ears and earwax removal. Patient presents today with mild bilateral ear itching. Patient denies current  symptoms of hearing difficulty. Denies current symptoms of tinnitus, ear pain, ear drainage, aural fullness, autophony, or vertigo.  Patient last seen by PCP several years ago for the removal of impacted cerumen.  Patient has not been using ceruminolytic agents/drops to loosen wax immediately prior to this visit. The patient does not insert Q-tips and/or foreign objects in the ear canals. Patient denies a past medical history of recurrent ear infections. Patient denies any prior history of ear surgery. Patient denies history of PE tube insertion. Patient denies history of prolonged/traumatic loud noise exposure.     REVIEW OF SYSTEMS:  All other systems negative.    Objective   Physical Exam:  Right Ear: External inspection of ear with no deformity, scars, or masses. EAC is partially impacted with cerumen. Unable to visualize tympanic membrane.  Left Ear: External inspection of ear with no deformity, scars, or masses. EAC is partially impacted with cerumen. Unable to visualize tympanic membrane.       Nose: External inspection of nose: No nasal lesions, lacerations or scars.   Neurologic: Cranial nerves II-XII grossly intact and symmetric bilaterally.  Head and Face:  Head: Atraumatic with no masses, lesions or scarring.  Face: Normal symmetry. No scars or deformities.  Eyes: Conjunctiva not edematous or erythematous.   Neck: Normal appearing, symmetric, trachea midline.   Cardiovascular: Examination of peripheral vascular system shows no clubbing or cyanosis.  Respiratory: No respiratory distress increased work of breathing. Inspection of the chest with symmetric chest expansion and normal respiratory effort.  Skin: No head and neck rashes.    EAR CLEANING PROCEDURE NOTE:  Indication: Cerumen impaction  Location: bilateral ear canal(s)  Procedure Note: The procedure was performed by the provider.  Visualization Instrument: A microscope with a #5 speculum was placed in the ear canals to visualize the ear canal  debris.  Ear Cleaning Instrument and Outcome: Using the 5 suction and alligator forceps, a moderate amount of soft, yellow cerumen was removed from the impacted EAC(s).  Patient Status: The patient tolerated the procedure well.  Complications: There were no complications.  Post-Procedure/Microscopic Otologic Exam:  Right Ear--EAC is clear. TM is intact with no sign of infection, effusion, or retraction.  No perforation seen. Auto insufflation visible under microscopy.  Left Ear-- EAC is clear. TM is intact with no sign of infection, effusion, or retraction.  No perforation seen. Auto insufflation visible under microscopy.

## 2025-04-21 ENCOUNTER — PATIENT MESSAGE (OUTPATIENT)
Dept: OBSTETRICS AND GYNECOLOGY | Facility: CLINIC | Age: 70
End: 2025-04-21
Payer: MEDICARE

## 2025-04-30 ENCOUNTER — APPOINTMENT (OUTPATIENT)
Dept: UROLOGY | Facility: CLINIC | Age: 70
End: 2025-04-30
Payer: MEDICARE

## 2025-05-27 NOTE — PROGRESS NOTES
Referred by: Dr. Alcocer     PCP  Swetha Esquivel MD         CHIEF COMPLAINT:  urinary hesitancy         HISTORY OF PRESENT ILLNESS:  This is a  69 y.o. y.o. female who presents with urinary hesitancy. Sometimes sits and feels like she has to wait to urinate. Feels she may coincidentally have a UTI, started two days ago. Hurts to urinate, pressure in her bladder area, constant urge. Denies hematuria.    The following were reviewed to gain additional history:  External notes: Dr. Alcocer note 2/5/25, Dr. Esquivel note 1/27/25  Test results:   Lab Results   Component Value Date    URINECULTURE Normal genitourinary alexandra 10/11/2024   Cr 0.73 7/12/24           Specifically, she describes the following pelvic floor symptoms:          Prolapse: No       - Splinting to urinate: No       - Splinting for bowel movement/stool trapping: No              Incontinence:  No                         Urinary Symptoms:       - Frequency:  Yes             # Voids:  every hour       - Nocturia: Yes             # Voids:  3       - Urgency:  No       - Incomplete emptying:  No       - Hesitancy:  Yes - see above       - Pain with voiding:  No       - Excessive fluid intake: water with lemon, 2 cups of coffee               History:       - Recurrent UTI:  No       - Hematuria:  No       - Stones:  No       - Kidney Disease:  No                  Bowel Symptoms:       - Regular: Yes       - Diarrhea:No       - Constipation: No       - Fecal Incontinence:  No       - Flatus Incontinence:  No       - Fecal urgency:   No    Past medical and surgical hx reviewed - pertinent for   PMH HLD, HTN, CAD (managed with statin), breast cancer  PSH lumpectomy, radiation therapy  Smoking history: former, quit 2006        Gyn History:  - Postmenopause: Yes, age 48           Postmenopausal bleeding: No  - HRT: No  - Pap up to date: last 2018 (over 66 y/o)   History of abnormal pap: Yes, had a biopsy - once, remotely  - Sexually active:  Yes, new partner  "relatively recently  Dyspareunia: yes, helped with lubrication   Other issues:  - Number of prior vaginal deliveries: 0   Number of prior operative deliveries: 0   Prior OASI? 0  - Number of prior c-sections: 0    - Mammogram up to date: Yes  - Colonoscopy up to date: Yes    OB History    No obstetric history on file.               PHYSICAL EXAMINATION:  No LMP recorded. Patient is postmenopausal.  Body mass index is 20.36 kg/m².  /72   Ht 1.702 m (5' 7\")   Wt 59 kg (130 lb)   BMI 20.36 kg/m²   General Appearance: well appearing  Neuro: Alert and oriented   HEENT: mucous membranes moist, neck supple  Resp: No respiratory distress, normal work of breathing  MSK: normal range of motion, gait appropriate    Pelvic:  Genitourinary: normal external genitalia, Bartholin's glands negative, [place]'s glands negative  Urethra: normal meatus, non-tender, no periurethral mass  Vaginal mucosa  normal  Cervix  normal  Uterus normal size, non-tender, mobile  Adnexae  negative nontender, no masses  Atrophy positive    CST negative    POP-Q (in supine position):  No significant prolapse    Rectal: no hemorrhoids, fissures or masses    PVR (by Ultrasound): 45          IMPRESSION AND PLAN:  Elsa Rojo is a 69 y.o. who presents with vaginal atrophy, OAB    Vaginal atrophy  - reviewed pathophysiology of vaginal atrophy/genitourinary syndrome of menopause  - discussed effects of lack of estrogen on vaginal lining, vaginal alexandra, and lower urinary tract symptoms  - counseled on vaginal estrogen cream as treatment option and reviewed this is not considered hormone therapy, low systemic absorption (reviewed data regarding safety even in patients with breast cancer)  - reviewed instructions for use: pea sized amount twice weekly at bedtime in the vagina  - rx for estradiol cream sent to preferred pharmacy    OAB  - discussed etiology of OAB and potential management options  - reviewed bladder health recommendations (fluid intake, " avoiding bladder triggers)  - discussed treatment options: PFPT, medications, PTNS, intradetrusor botox, SNM  - opting for PFPT, referral made today      All questions and concerns were answered and addressed.  The patient expressed understanding and agrees with the plan. Happy to see her back if any issues persist with her bladder.    RTC as needed, ok to continue care with Dr. Alcocer if symptoms well controlled with the above interventions.     6/2/2025

## 2025-06-02 ENCOUNTER — APPOINTMENT (OUTPATIENT)
Dept: OBSTETRICS AND GYNECOLOGY | Facility: CLINIC | Age: 70
End: 2025-06-02
Payer: MEDICARE

## 2025-06-02 VITALS
BODY MASS INDEX: 20.4 KG/M2 | HEIGHT: 67 IN | WEIGHT: 130 LBS | DIASTOLIC BLOOD PRESSURE: 72 MMHG | SYSTOLIC BLOOD PRESSURE: 116 MMHG

## 2025-06-02 DIAGNOSIS — R30.9 URINARY PAIN: ICD-10-CM

## 2025-06-02 DIAGNOSIS — N39.0 ACUTE UTI: Primary | ICD-10-CM

## 2025-06-02 DIAGNOSIS — N32.81 OAB (OVERACTIVE BLADDER): ICD-10-CM

## 2025-06-02 DIAGNOSIS — N95.2 VAGINAL ATROPHY: ICD-10-CM

## 2025-06-02 LAB
POC APPEARANCE, URINE: ABNORMAL
POC BILIRUBIN, URINE: NEGATIVE
POC BLOOD, URINE: ABNORMAL
POC COLOR, URINE: ABNORMAL
POC GLUCOSE, URINE: NEGATIVE MG/DL
POC KETONES, URINE: NEGATIVE MG/DL
POC LEUKOCYTES, URINE: ABNORMAL
POC NITRITE,URINE: NEGATIVE
POC PH, URINE: 6 PH
POC PROTEIN, URINE: ABNORMAL MG/DL
POC SPECIFIC GRAVITY, URINE: >=1.03
POC UROBILINOGEN, URINE: 0.2 EU/DL

## 2025-06-02 PROCEDURE — 1159F MED LIST DOCD IN RCRD: CPT | Performed by: STUDENT IN AN ORGANIZED HEALTH CARE EDUCATION/TRAINING PROGRAM

## 2025-06-02 PROCEDURE — 3008F BODY MASS INDEX DOCD: CPT | Performed by: STUDENT IN AN ORGANIZED HEALTH CARE EDUCATION/TRAINING PROGRAM

## 2025-06-02 PROCEDURE — 3074F SYST BP LT 130 MM HG: CPT | Performed by: STUDENT IN AN ORGANIZED HEALTH CARE EDUCATION/TRAINING PROGRAM

## 2025-06-02 PROCEDURE — 81003 URINALYSIS AUTO W/O SCOPE: CPT | Performed by: STUDENT IN AN ORGANIZED HEALTH CARE EDUCATION/TRAINING PROGRAM

## 2025-06-02 PROCEDURE — 99214 OFFICE O/P EST MOD 30 MIN: CPT | Performed by: STUDENT IN AN ORGANIZED HEALTH CARE EDUCATION/TRAINING PROGRAM

## 2025-06-02 PROCEDURE — 3078F DIAST BP <80 MM HG: CPT | Performed by: STUDENT IN AN ORGANIZED HEALTH CARE EDUCATION/TRAINING PROGRAM

## 2025-06-02 PROCEDURE — 1036F TOBACCO NON-USER: CPT | Performed by: STUDENT IN AN ORGANIZED HEALTH CARE EDUCATION/TRAINING PROGRAM

## 2025-06-02 PROCEDURE — 1125F AMNT PAIN NOTED PAIN PRSNT: CPT | Performed by: STUDENT IN AN ORGANIZED HEALTH CARE EDUCATION/TRAINING PROGRAM

## 2025-06-02 RX ORDER — NITROFURANTOIN 25; 75 MG/1; MG/1
100 CAPSULE ORAL 2 TIMES DAILY
Qty: 10 CAPSULE | Refills: 0 | Status: SHIPPED | OUTPATIENT
Start: 2025-06-02 | End: 2025-06-07

## 2025-06-02 RX ORDER — TRETINOIN 0.5 MG/G
CREAM TOPICAL
COMMUNITY
Start: 2025-03-08

## 2025-06-02 RX ORDER — ESTRADIOL 0.1 MG/G
CREAM VAGINAL
Qty: 42.5 G | Refills: 2 | Status: SHIPPED | OUTPATIENT
Start: 2025-06-02

## 2025-06-02 ASSESSMENT — PAIN SCALES - GENERAL: PAINLEVEL_OUTOF10: 8

## 2025-06-04 ENCOUNTER — TELEPHONE (OUTPATIENT)
Dept: OBSTETRICS AND GYNECOLOGY | Facility: CLINIC | Age: 70
End: 2025-06-04
Payer: MEDICARE

## 2025-06-04 NOTE — TELEPHONE ENCOUNTER
----- Message from Kellie Carias sent at 6/4/2025 10:13 AM EDT -----  Please send macrobid 5 day course to preferred pharmacy and inform patient. Thanks!  ----- Message -----  From: Brenna Pena MA  Sent: 6/2/2025   8:48 AM EDT  To: Kellie Carias MD

## 2025-06-04 NOTE — TELEPHONE ENCOUNTER
Pt contacted.   Discussed + urine culture prelim report.  Dr collazo advises to start Rx.  Pt states she was prescribed macrobid day of visit and is feeling much improved

## 2025-06-05 LAB — BACTERIA UR CULT: ABNORMAL

## 2025-07-01 ENCOUNTER — APPOINTMENT (OUTPATIENT)
Dept: BEHAVIORAL HEALTH | Facility: CLINIC | Age: 70
End: 2025-07-01
Payer: MEDICARE

## 2025-07-01 VITALS
RESPIRATION RATE: 18 BRPM | DIASTOLIC BLOOD PRESSURE: 64 MMHG | WEIGHT: 132.7 LBS | SYSTOLIC BLOOD PRESSURE: 103 MMHG | HEIGHT: 68 IN | HEART RATE: 61 BPM | BODY MASS INDEX: 20.11 KG/M2

## 2025-07-01 DIAGNOSIS — F33.9 DEPRESSION, RECURRENT: ICD-10-CM

## 2025-07-01 DIAGNOSIS — R25.1 TREMOR: ICD-10-CM

## 2025-07-01 PROCEDURE — 1160F RVW MEDS BY RX/DR IN RCRD: CPT | Performed by: PSYCHIATRY & NEUROLOGY

## 2025-07-01 PROCEDURE — 1126F AMNT PAIN NOTED NONE PRSNT: CPT | Performed by: PSYCHIATRY & NEUROLOGY

## 2025-07-01 PROCEDURE — 1159F MED LIST DOCD IN RCRD: CPT | Performed by: PSYCHIATRY & NEUROLOGY

## 2025-07-01 PROCEDURE — 3008F BODY MASS INDEX DOCD: CPT | Performed by: PSYCHIATRY & NEUROLOGY

## 2025-07-01 PROCEDURE — 3078F DIAST BP <80 MM HG: CPT | Performed by: PSYCHIATRY & NEUROLOGY

## 2025-07-01 PROCEDURE — 3074F SYST BP LT 130 MM HG: CPT | Performed by: PSYCHIATRY & NEUROLOGY

## 2025-07-01 PROCEDURE — 99213 OFFICE O/P EST LOW 20 MIN: CPT | Performed by: PSYCHIATRY & NEUROLOGY

## 2025-07-01 RX ORDER — PROPRANOLOL HYDROCHLORIDE 10 MG/1
10 TABLET ORAL DAILY
Qty: 90 TABLET | Refills: 0 | Status: SHIPPED | OUTPATIENT
Start: 2025-07-01 | End: 2025-09-29

## 2025-07-01 RX ORDER — ESCITALOPRAM OXALATE 10 MG/1
10 TABLET ORAL DAILY
Qty: 90 TABLET | Refills: 0 | Status: SHIPPED | OUTPATIENT
Start: 2025-07-01 | End: 2025-09-29

## 2025-07-01 RX ORDER — TRAZODONE HYDROCHLORIDE 100 MG/1
100 TABLET ORAL NIGHTLY
Qty: 90 TABLET | Refills: 1 | Status: SHIPPED | OUTPATIENT
Start: 2025-07-01

## 2025-07-01 ASSESSMENT — ENCOUNTER SYMPTOMS
TREMORS: 0
DECREASED CONCENTRATION: 0
APPETITE CHANGE: 0
SLEEP DISTURBANCE: 0
HALLUCINATIONS: 0
NERVOUS/ANXIOUS: 0
FATIGUE: 0
HYPERACTIVE: 0
DYSPHORIC MOOD: 0

## 2025-07-01 ASSESSMENT — PAIN SCALES - GENERAL: PAINLEVEL_OUTOF10: 0-NO PAIN

## 2025-07-01 NOTE — PROGRESS NOTES
[facility] Nurse Note    Name: Elsa Rooj  MRN: [id number]  YOB: 1955    Date: 07/01/25    Reason for Visit:  No chief complaint on file.    Vitals:       Problem List[1]    Allergies[2]    Encounter Medications[3]    Progress:             [1]   Patient Active Problem List  Diagnosis    Hyperlipidemia    Acquired hammertoe of right foot    Anxiety    Benign colonic polyp    Tubular adenoma of colon    Benign essential hypertension    Blocked ear    CAD (coronary artery disease), native coronary artery    Coronary artery calcification    Dizziness    Hyperplastic colon polyp    Impacted cerumen of both ears    Impacted cerumen of right ear    Left knee pain    Left shoulder pain    Lichen sclerosus    Liver cyst    Mild atherosclerosis of carotid artery    Multiple thyroid nodules    Osteopenia    Pre-ulcerative corn or callous    Chronic pain of toe of right foot    Toe pain    Urinary frequency    Vaginal candidiasis    Vaginal itching    Depression, recurrent    Visit for screening mammogram    Dermatitis, unspecified    Rosacea, unspecified    Left elbow pain    Other insomnia    Right hip pain    Lichen sclerosus of vulva    Breast pain in female    Acquired hallux malleus    Diverticulosis of colon    History of adenomatous polyp of colon    Lateral epicondylitis of left elbow    Microscopic hematuria    History of invasive breast cancer    Disorder of hip region    Trochanteric bursitis of right hip    Reactive depression    Thyroid nodule    Medicare annual wellness visit, subsequent    Bereavement    Weight loss    High risk medication use    UTI symptoms    Mass of left foot    Stenosis of left carotid artery    Foot mass, left    Vaginal dryness, menopausal    Tremor   [2]   Allergies  Allergen Reactions    Cephalexin Hives and Rash    Bacitracin Zinc-Polymyxin B Unknown    Bupropion Hives    Penicillins Hives    Bacitracin Rash     Positive patch test    Neomycin Rash     Positive patch test   [3]    Outpatient Encounter Medications as of 7/1/2025   Medication Sig Dispense Refill    aspirin 81 mg EC tablet Take 1 tablet (81 mg) by mouth once daily.      atorvastatin (Lipitor) 20 mg tablet TAKE 1 TABLET DAILY 90 tablet 3    betamethasone, augmented, (Diprolene, augmented,) 0.05 % ointment Apply topically 2 times a day. 15 g 0    CALCIUM ORAL Take 1 tablet by mouth once daily.      cholecalciferol (Vitamin D-3) 50 mcg (2,000 unit) capsule Take 2 capsules (100 mcg) by mouth once daily.      escitalopram (Lexapro) 20 mg tablet Take 1 tablet (20 mg) by mouth once daily. 90 tablet 1    estradiol (Estrace) 0.01 % (0.1 mg/gram) vaginal cream Use a pea sized amount in the vagina twice weekly at bedtime 42.5 g 2    HAIR, SKIN AND NAILS, BIOTIN, ORAL Take 1 capsule by mouth once daily.      propranolol (Inderal) 10 mg tablet Take 1 tablet (10 mg) by mouth 2 times a day. 180 tablet 1    traZODone (Desyrel) 100 mg tablet Take 1 tablet (100 mg) by mouth once daily at bedtime. 90 tablet 1    tretinoin (Retin-A) 0.05 % cream APPLY A PEA SIZED AMOUNT EVENLY TO FACE 2-3 TIMES A WEEK  WORKING UP TO NIGHTLY APPLICATION AS TOLERATED       No facility-administered encounter medications on file as of 7/1/2025.

## 2025-07-01 NOTE — PROGRESS NOTES
Subjective   Patient ID: Elsa Rojo is a 70 y.o. female who presents for follow up  HPI  In a relationship now. Going well   No longer wants to take antidepressant as her mood is good and depression is controlled. Sleep is ok with Trazodone. No thoughts of suicide.   Previously on antidepressant for a month after diagnosed with breast cancer. That was many years ago.   Tremor is controlled   There has been times when she did not take Propranolol at night and she was fine with no tremor   Has good support system   Medications Ordered Prior to Encounter[1]   Problem List[2]   Review of Systems   Constitutional:  Negative for appetite change and fatigue.   Musculoskeletal:  Negative for gait problem.   Neurological:  Negative for tremors.   Psychiatric/Behavioral:  Negative for decreased concentration, dysphoric mood, hallucinations, self-injury, sleep disturbance and suicidal ideas. The patient is not nervous/anxious and is not hyperactive.        Objective   Physical Exam  Psychiatric:         Attention and Perception: Attention normal.         Mood and Affect: Mood normal.         Speech: Speech normal.         Behavior: Behavior is cooperative.         Thought Content: Thought content normal.         Cognition and Memory: Cognition normal.         Judgment: Judgment normal.       There were no vitals filed for this visit.   Office Visit on 06/02/2025   Component Date Value    POC Color, Urine 06/02/2025 Rae (A)     POC Appearance, Urine 06/02/2025 Cloudy (A)     POC Glucose, Urine 06/02/2025 NEGATIVE     POC Bilirubin, Urine 06/02/2025 NEGATIVE     POC Ketones, Urine 06/02/2025 NEGATIVE     POC Specific Gravity, Ur* 06/02/2025 >=1.030     POC Blood, Urine 06/02/2025 LARGE (3+) (A)     POC PH, Urine 06/02/2025 6.0     POC Protein, Urine 06/02/2025 300 (3+) (A)     POC Urobilinogen, Urine 06/02/2025 0.2     Poc Nitrite, Urine 06/02/2025 NEGATIVE     POC Leukocytes, Urine 06/02/2025 TRACE (A)     CULTURE, URINE,  ROUTINE 06/02/2025 SEE NOTE (A)      Lab Results   Component Value Date     07/12/2024     07/25/2023     07/19/2022     08/05/2021    K 4.3 07/12/2024    K 4.1 07/25/2023    K 3.8 07/19/2022    K 3.8 08/05/2021    CO2 29 07/12/2024    CO2 31 07/25/2023    CO2 29 07/19/2022    CO2 30 08/05/2021    BUN 19 07/12/2024    BUN 14 07/25/2023    BUN 15 07/19/2022    BUN 15 08/05/2021    CREATININE 0.73 07/12/2024    CREATININE 0.67 07/25/2023    CREATININE 0.65 07/19/2022    CREATININE 0.68 08/05/2021    GLUCOSE 100 (H) 07/12/2024    GLUCOSE 94 07/25/2023    GLUCOSE 95 07/19/2022    GLUCOSE 90 08/05/2021    CALCIUM 9.5 07/12/2024    CALCIUM 9.8 07/25/2023    CALCIUM 9.7 07/19/2022    CALCIUM 9.3 08/05/2021     Lab Results   Component Value Date    WBC 6.1 07/12/2024    HGB 13.4 07/12/2024    HCT 42.0 07/12/2024    MCV 98 07/12/2024     07/12/2024     Lab Results   Component Value Date    CHOL 150 07/12/2024    TRIG 72 07/12/2024    HDL 63.3 07/12/2024     Assessment/Plan   Problem List Items Addressed This Visit           ICD-10-CM    Depression, recurrent F33.9    Tremor R25.1     Will decrease Lexapro to 10 mg po daily, decrease Propranolol to 10 mg po daily   Keep Trazodone at 100 mg po at bedtime PRN   Continue therapy   Follow up 2-3 months or sooner if needed           [1]   Current Outpatient Medications on File Prior to Visit   Medication Sig Dispense Refill    aspirin 81 mg EC tablet Take 1 tablet (81 mg) by mouth once daily.      atorvastatin (Lipitor) 20 mg tablet TAKE 1 TABLET DAILY 90 tablet 3    betamethasone, augmented, (Diprolene, augmented,) 0.05 % ointment Apply topically 2 times a day. 15 g 0    CALCIUM ORAL Take 1 tablet by mouth once daily.      cholecalciferol (Vitamin D-3) 50 mcg (2,000 unit) capsule Take 2 capsules (100 mcg) by mouth once daily.      escitalopram (Lexapro) 20 mg tablet Take 1 tablet (20 mg) by mouth once daily. 90 tablet 1    estradiol (Estrace) 0.01 %  (0.1 mg/gram) vaginal cream Use a pea sized amount in the vagina twice weekly at bedtime 42.5 g 2    HAIR, SKIN AND NAILS, BIOTIN, ORAL Take 1 capsule by mouth once daily.      propranolol (Inderal) 10 mg tablet Take 1 tablet (10 mg) by mouth 2 times a day. 180 tablet 1    traZODone (Desyrel) 100 mg tablet Take 1 tablet (100 mg) by mouth once daily at bedtime. 90 tablet 1    tretinoin (Retin-A) 0.05 % cream APPLY A PEA SIZED AMOUNT EVENLY TO FACE 2-3 TIMES A WEEK  WORKING UP TO NIGHTLY APPLICATION AS TOLERATED       No current facility-administered medications on file prior to visit.   [2]   Patient Active Problem List  Diagnosis    Hyperlipidemia    Acquired hammertoe of right foot    Anxiety    Benign colonic polyp    Tubular adenoma of colon    Benign essential hypertension    Blocked ear    CAD (coronary artery disease), native coronary artery    Coronary artery calcification    Dizziness    Hyperplastic colon polyp    Impacted cerumen of both ears    Impacted cerumen of right ear    Left knee pain    Left shoulder pain    Lichen sclerosus    Liver cyst    Mild atherosclerosis of carotid artery    Multiple thyroid nodules    Osteopenia    Pre-ulcerative corn or callous    Chronic pain of toe of right foot    Toe pain    Urinary frequency    Vaginal candidiasis    Vaginal itching    Depression, recurrent    Visit for screening mammogram    Dermatitis, unspecified    Rosacea, unspecified    Left elbow pain    Other insomnia    Right hip pain    Lichen sclerosus of vulva    Breast pain in female    Acquired hallux malleus    Diverticulosis of colon    History of adenomatous polyp of colon    Lateral epicondylitis of left elbow    Microscopic hematuria    History of invasive breast cancer    Disorder of hip region    Trochanteric bursitis of right hip    Reactive depression    Thyroid nodule    Medicare annual wellness visit, subsequent    Bereavement    Weight loss    High risk medication use    UTI symptoms     Mass of left foot    Stenosis of left carotid artery    Foot mass, left    Vaginal dryness, menopausal    Tremor

## 2025-07-04 DIAGNOSIS — E78.5 HYPERLIPIDEMIA, UNSPECIFIED HYPERLIPIDEMIA TYPE: ICD-10-CM

## 2025-07-07 ENCOUNTER — PATIENT MESSAGE (OUTPATIENT)
Dept: PRIMARY CARE | Facility: CLINIC | Age: 70
End: 2025-07-07
Payer: MEDICARE

## 2025-07-07 RX ORDER — ATORVASTATIN CALCIUM 20 MG/1
20 TABLET, FILM COATED ORAL DAILY
Qty: 90 TABLET | Refills: 3 | Status: SHIPPED | OUTPATIENT
Start: 2025-07-07

## 2025-07-22 ENCOUNTER — APPOINTMENT (OUTPATIENT)
Dept: PRIMARY CARE | Facility: CLINIC | Age: 70
End: 2025-07-22
Payer: MEDICARE

## 2025-07-22 VITALS
TEMPERATURE: 97.4 F | DIASTOLIC BLOOD PRESSURE: 65 MMHG | HEART RATE: 55 BPM | HEIGHT: 66 IN | WEIGHT: 133 LBS | BODY MASS INDEX: 21.38 KG/M2 | OXYGEN SATURATION: 98 % | SYSTOLIC BLOOD PRESSURE: 113 MMHG

## 2025-07-22 DIAGNOSIS — K63.5 HYPERPLASTIC COLONIC POLYP, UNSPECIFIED PART OF COLON: ICD-10-CM

## 2025-07-22 DIAGNOSIS — Z63.4 BEREAVEMENT: ICD-10-CM

## 2025-07-22 DIAGNOSIS — Z00.00 ROUTINE GENERAL MEDICAL EXAMINATION AT HEALTH CARE FACILITY: ICD-10-CM

## 2025-07-22 DIAGNOSIS — Z12.31 ENCOUNTER FOR SCREENING MAMMOGRAM FOR BREAST CANCER: ICD-10-CM

## 2025-07-22 DIAGNOSIS — G47.09 OTHER INSOMNIA: ICD-10-CM

## 2025-07-22 DIAGNOSIS — Z78.0 ASYMPTOMATIC MENOPAUSE: ICD-10-CM

## 2025-07-22 DIAGNOSIS — N90.4 LICHEN SCLEROSUS OF VULVA: ICD-10-CM

## 2025-07-22 DIAGNOSIS — R17 SERUM TOTAL BILIRUBIN ELEVATED: ICD-10-CM

## 2025-07-22 DIAGNOSIS — E04.2 MULTIPLE THYROID NODULES: ICD-10-CM

## 2025-07-22 DIAGNOSIS — M85.80 OSTEOPENIA, UNSPECIFIED LOCATION: ICD-10-CM

## 2025-07-22 DIAGNOSIS — E78.49 OTHER HYPERLIPIDEMIA: ICD-10-CM

## 2025-07-22 DIAGNOSIS — R71.8 HIGH MEAN CORPUSCULAR HEMOGLOBIN CONCENTRATION (MCHC): ICD-10-CM

## 2025-07-22 DIAGNOSIS — Z00.00 MEDICARE ANNUAL WELLNESS VISIT, SUBSEQUENT: Primary | ICD-10-CM

## 2025-07-22 DIAGNOSIS — F33.9 DEPRESSION, RECURRENT: ICD-10-CM

## 2025-07-22 LAB
ALBUMIN SERPL-MCNC: 4.1 G/DL (ref 3.6–5.1)
ALP SERPL-CCNC: 52 U/L (ref 37–153)
ALT SERPL-CCNC: 21 U/L (ref 6–29)
ANION GAP SERPL CALCULATED.4IONS-SCNC: 8 MMOL/L (CALC) (ref 7–17)
AST SERPL-CCNC: 23 U/L (ref 10–35)
BILIRUB SERPL-MCNC: 1.6 MG/DL (ref 0.2–1.2)
BUN SERPL-MCNC: 21 MG/DL (ref 7–25)
CALCIUM SERPL-MCNC: 9.5 MG/DL (ref 8.6–10.4)
CHLORIDE SERPL-SCNC: 104 MMOL/L (ref 98–110)
CHOLEST SERPL-MCNC: 144 MG/DL
CHOLEST/HDLC SERPL: 2.2 (CALC)
CO2 SERPL-SCNC: 30 MMOL/L (ref 20–32)
CREAT SERPL-MCNC: 0.56 MG/DL (ref 0.6–1)
EGFRCR SERPLBLD CKD-EPI 2021: 98 ML/MIN/1.73M2
ERYTHROCYTE [DISTWIDTH] IN BLOOD BY AUTOMATED COUNT: 11.7 % (ref 11–15)
GLUCOSE SERPL-MCNC: 94 MG/DL (ref 65–99)
HCT VFR BLD AUTO: 39.3 % (ref 35–45)
HDLC SERPL-MCNC: 66 MG/DL
HGB BLD-MCNC: 12.7 G/DL (ref 11.7–15.5)
LDLC SERPL CALC-MCNC: 65 MG/DL (CALC)
MCH RBC QN AUTO: 32.8 PG (ref 27–33)
MCHC RBC AUTO-ENTMCNC: 32.3 G/DL (ref 32–36)
MCV RBC AUTO: 101.6 FL (ref 80–100)
NONHDLC SERPL-MCNC: 78 MG/DL (CALC)
PLATELET # BLD AUTO: 200 THOUSAND/UL (ref 140–400)
PMV BLD REES-ECKER: 12.3 FL (ref 7.5–12.5)
POTASSIUM SERPL-SCNC: 4.1 MMOL/L (ref 3.5–5.3)
PROT SERPL-MCNC: 6 G/DL (ref 6.1–8.1)
RBC # BLD AUTO: 3.87 MILLION/UL (ref 3.8–5.1)
SODIUM SERPL-SCNC: 142 MMOL/L (ref 135–146)
TRIGL SERPL-MCNC: 54 MG/DL
TSH SERPL-ACNC: 1.05 MIU/L (ref 0.4–4.5)
WBC # BLD AUTO: 5.2 THOUSAND/UL (ref 3.8–10.8)

## 2025-07-22 PROCEDURE — 1160F RVW MEDS BY RX/DR IN RCRD: CPT | Performed by: INTERNAL MEDICINE

## 2025-07-22 PROCEDURE — 1170F FXNL STATUS ASSESSED: CPT | Performed by: INTERNAL MEDICINE

## 2025-07-22 PROCEDURE — 3008F BODY MASS INDEX DOCD: CPT | Performed by: INTERNAL MEDICINE

## 2025-07-22 PROCEDURE — 3078F DIAST BP <80 MM HG: CPT | Performed by: INTERNAL MEDICINE

## 2025-07-22 PROCEDURE — 1159F MED LIST DOCD IN RCRD: CPT | Performed by: INTERNAL MEDICINE

## 2025-07-22 PROCEDURE — 99215 OFFICE O/P EST HI 40 MIN: CPT | Performed by: INTERNAL MEDICINE

## 2025-07-22 PROCEDURE — 3074F SYST BP LT 130 MM HG: CPT | Performed by: INTERNAL MEDICINE

## 2025-07-22 PROCEDURE — 1158F ADVNC CARE PLAN TLK DOCD: CPT | Performed by: INTERNAL MEDICINE

## 2025-07-22 PROCEDURE — G0439 PPPS, SUBSEQ VISIT: HCPCS | Performed by: INTERNAL MEDICINE

## 2025-07-22 SDOH — SOCIAL STABILITY - SOCIAL INSECURITY: DISSAPEARANCE AND DEATH OF FAMILY MEMBER: Z63.4

## 2025-07-22 ASSESSMENT — ACTIVITIES OF DAILY LIVING (ADL)
DRESSING: INDEPENDENT
GROCERY_SHOPPING: INDEPENDENT
MANAGING_FINANCES: INDEPENDENT
TAKING_MEDICATION: INDEPENDENT
DOING_HOUSEWORK: INDEPENDENT
BATHING: INDEPENDENT

## 2025-07-22 ASSESSMENT — ENCOUNTER SYMPTOMS
NEUROLOGICAL NEGATIVE: 1
GASTROINTESTINAL NEGATIVE: 1
CONSTITUTIONAL NEGATIVE: 1
PSYCHIATRIC NEGATIVE: 1
RESPIRATORY NEGATIVE: 1
CARDIOVASCULAR NEGATIVE: 1
EYES NEGATIVE: 1
HEMATOLOGIC/LYMPHATIC NEGATIVE: 1

## 2025-07-22 NOTE — ASSESSMENT & PLAN NOTE
Improved managed by her psychiatrist Lexapro being tapered down and eventually off continue trazodone

## 2025-07-22 NOTE — ASSESSMENT & PLAN NOTE
I recommend RSV vaccine  Advised to keep a heart healthy, low-fat diet as recommended diet is the Mediterranean diet.  Advised to exercise regularly for 30 minutes daily 5 days a week and maintain 150 minutes of exercise per week.  Discussed age-appropriate cancer screening,Immunization and recommendation were given.  Advised on regular eye and dental visit.  Advised on staying well-hydrated.

## 2025-07-22 NOTE — PROGRESS NOTES
"Subjective   Reason for Visit: Elsa Rojo is an 70 y.o. female here for a Medicare Wellness visit.     Past Medical, Surgical, and Family History reviewed and updated in chart.    Reviewed all medications by prescribing practitioner or clinical pharmacist (such as prescriptions, OTCs, herbal therapies and supplements) and documented in the medical record.    Here for MCR , to go over her lab result and to follow-up on her medical problem she has HTN, thyroid nodule and mild minimal plaque carotid deposit on the right side.  She continue to see the psychiatrist Dr.Basem Carroll , her Lexapro has been tapered down, she continued to be on trazodone for insomnia, has been doing fairly well, has good support from family and friend, has been exercising regularly, denies any chest pain or shortness of breath.  No longer having any hip pain or urinary problem, she has been using estrogen cream prescribed by her urologist.  She has an updated living will and advanced directive, will try to send me a copy through [employer].  Last colonoscopy 4/1/2021, next in 5 years which would be 26.  Last mammogram 12/19/2024  Last DEXA 12/16/2022  She is a former smoker quit in 2006, denies any cough or shortness of breath.  She is also a left breast cancer survivor was diagnosed in 2006.        Patient Care Team:  Swetha Esquivel MD as PCP - General  Swetha Esquivel MD as PCP - [facility] Attributed Provider     Review of Systems   Constitutional: Negative.    HENT: Negative.     Eyes: Negative.    Respiratory: Negative.     Cardiovascular: Negative.    Gastrointestinal: Negative.    Genitourinary: Negative.    Neurological: Negative.    Hematological: Negative.    Psychiatric/Behavioral: Negative.         Objective   Vitals:  /65 (BP Location: Right arm, Patient Position: Sitting, BP Cuff Size: Adult)   Pulse 55   Temp 36.3 °C (97.4 °F) (Temporal)   Ht 1.685 m (5' 6.34\")   Wt 60.3 kg (133 lb)   SpO2 98%   BMI 21.25 kg/m²   "     Physical Exam  Vitals reviewed.   Constitutional:       General: She is not in acute distress.     Appearance: Normal appearance. She is normal weight.   HENT:      Head: Normocephalic and atraumatic.      Right Ear: Tympanic membrane, ear canal and external ear normal.      Left Ear: Tympanic membrane, ear canal and external ear normal.      Mouth/Throat:      Mouth: Mucous membranes are moist.      Pharynx: No oropharyngeal exudate or posterior oropharyngeal erythema.     Eyes:      General: No scleral icterus.     Extraocular Movements: Extraocular movements intact.      Conjunctiva/sclera: Conjunctivae normal.      Pupils: Pupils are equal, round, and reactive to light.     Neck:      Vascular: No carotid bruit.     Cardiovascular:      Rate and Rhythm: Normal rate and regular rhythm.      Pulses: Normal pulses.      Heart sounds: Normal heart sounds. No murmur heard.  Pulmonary:      Effort: Pulmonary effort is normal. No respiratory distress.      Breath sounds: Normal breath sounds. No wheezing.   Abdominal:      General: Abdomen is flat. Bowel sounds are normal. There is no distension.      Palpations: Abdomen is soft. There is no mass.      Tenderness: There is no abdominal tenderness.     Musculoskeletal:         General: Normal range of motion.      Cervical back: Normal range of motion and neck supple.      Right lower leg: No edema.      Left lower leg: No edema.   Lymphadenopathy:      Cervical: No cervical adenopathy.     Skin:     Comments: Skin exam done by her dermatologist.     Neurological:      General: No focal deficit present.      Mental Status: She is alert and oriented to person, place, and time.      Cranial Nerves: No cranial nerve deficit.      Motor: No weakness.      Gait: Gait normal.     Psychiatric:         Mood and Affect: Mood normal.         Behavior: Behavior normal.         Assessment & Plan  Routine general medical examination at health care facility    Orders:    1 Year  Follow Up In [facility] - PCP - Wellness Exam; Future    Multiple thyroid nodules  Last thyroid ultrasound 7/24,will do one in 2026         Asymptomatic menopause    Orders:    XR DEXA bone density; Future    Osteopenia, unspecified location  Continue ca,vit D and weight bearing exercise.  Order DEXA          Other insomnia         Lichen sclerosus of vulva         Hyperplastic colonic polyp, unspecified part of colon         Serum total bilirubin elevated    Orders:    Bilirubin, total; Future    Bilirubin, direct; Future    High mean corpuscular hemoglobin concentration (MCHC)    Orders:    Vitamin B12; Future    Folate; Future    Encounter for screening mammogram for breast cancer    Orders:    BI mammo bilateral screening tomosynthesis; Future    Other hyperlipidemia   The patient's most recent lipid profile reflects that lipid endpoints are at target.             Depression, recurrent  Improved managed by her psychiatrist Lexapro being tapered down and eventually off continue trazodone         Bereavement  Improved, patient has good support from her friend and her from her family.         Medicare annual wellness visit, subsequent  I recommend RSV vaccine  Advised to keep a heart healthy, low-fat diet as recommended diet is the Mediterranean diet.  Advised to exercise regularly for 30 minutes daily 5 days a week and maintain 150 minutes of exercise per week.  Discussed age-appropriate cancer screening,Immunization and recommendation were given.  Advised on regular eye and dental visit.  Advised on staying well-hydrated.

## 2025-07-27 DIAGNOSIS — I10 BENIGN ESSENTIAL HYPERTENSION: ICD-10-CM

## 2025-07-27 DIAGNOSIS — F33.9 DEPRESSION, RECURRENT: ICD-10-CM

## 2025-07-27 DIAGNOSIS — E78.49 OTHER HYPERLIPIDEMIA: ICD-10-CM

## 2025-08-07 LAB
BILIRUB DIRECT SERPL-MCNC: 0.3 MG/DL
BILIRUB SERPL-MCNC: 1.5 MG/DL (ref 0.2–1.2)
FOLATE SERPL-MCNC: >24 NG/ML
VIT B12 SERPL-MCNC: 373 PG/ML (ref 200–1100)

## 2025-10-03 ENCOUNTER — APPOINTMENT (OUTPATIENT)
Dept: BEHAVIORAL HEALTH | Facility: CLINIC | Age: 70
End: 2025-10-03
Payer: MEDICARE

## 2025-11-21 ENCOUNTER — APPOINTMENT (OUTPATIENT)
Dept: CARDIOLOGY | Facility: CLINIC | Age: 70
End: 2025-11-21
Payer: MEDICARE

## 2025-11-24 ENCOUNTER — APPOINTMENT (OUTPATIENT)
Dept: CARDIOLOGY | Facility: CLINIC | Age: 70
End: 2025-11-24
Payer: MEDICARE

## 2025-12-18 ENCOUNTER — APPOINTMENT (OUTPATIENT)
Dept: OBSTETRICS AND GYNECOLOGY | Facility: CLINIC | Age: 70
End: 2025-12-18
Payer: MEDICARE

## 2026-01-23 ENCOUNTER — APPOINTMENT (OUTPATIENT)
Dept: PRIMARY CARE | Facility: CLINIC | Age: 71
End: 2026-01-23
Payer: MEDICARE

## 2026-07-28 ENCOUNTER — APPOINTMENT (OUTPATIENT)
Dept: PRIMARY CARE | Facility: CLINIC | Age: 71
End: 2026-07-28
Payer: MEDICARE